# Patient Record
Sex: MALE | Race: BLACK OR AFRICAN AMERICAN | Employment: OTHER | URBAN - METROPOLITAN AREA
[De-identification: names, ages, dates, MRNs, and addresses within clinical notes are randomized per-mention and may not be internally consistent; named-entity substitution may affect disease eponyms.]

---

## 2017-06-30 ENCOUNTER — APPOINTMENT (OUTPATIENT)
Dept: CT IMAGING | Age: 32
DRG: 175 | End: 2017-06-30
Attending: PHYSICIAN ASSISTANT
Payer: OTHER GOVERNMENT

## 2017-06-30 ENCOUNTER — APPOINTMENT (OUTPATIENT)
Dept: GENERAL RADIOLOGY | Age: 32
DRG: 175 | End: 2017-06-30
Attending: PHYSICIAN ASSISTANT
Payer: OTHER GOVERNMENT

## 2017-06-30 ENCOUNTER — HOSPITAL ENCOUNTER (INPATIENT)
Age: 32
LOS: 5 days | Discharge: HOME | DRG: 175 | End: 2017-07-05
Attending: EMERGENCY MEDICINE | Admitting: HOSPITALIST
Payer: OTHER GOVERNMENT

## 2017-06-30 DIAGNOSIS — I26.99 BILATERAL PULMONARY EMBOLISM (HCC): Primary | ICD-10-CM

## 2017-06-30 DIAGNOSIS — I26.99 PE (PULMONARY THROMBOEMBOLISM) (HCC): ICD-10-CM

## 2017-06-30 LAB
ANION GAP BLD CALC-SCNC: 5 MMOL/L (ref 5–15)
APTT PPP: 26.3 SEC (ref 22.1–32.5)
BASOPHILS # BLD AUTO: 0 K/UL (ref 0–0.1)
BASOPHILS # BLD: 0 % (ref 0–1)
BNP SERPL-MCNC: <4 PG/ML (ref 0–100)
BUN SERPL-MCNC: 8 MG/DL (ref 6–20)
BUN/CREAT SERPL: 7 (ref 12–20)
CALCIUM SERPL-MCNC: 9.4 MG/DL (ref 8.5–10.1)
CHLORIDE SERPL-SCNC: 102 MMOL/L (ref 97–108)
CO2 SERPL-SCNC: 30 MMOL/L (ref 21–32)
CREAT SERPL-MCNC: 1.12 MG/DL (ref 0.7–1.3)
D DIMER PPP FEU-MCNC: 2.1 MG/L FEU (ref 0–0.65)
DIFFERENTIAL METHOD BLD: ABNORMAL
EOSINOPHIL # BLD: 0 K/UL (ref 0–0.4)
EOSINOPHIL NFR BLD: 0 % (ref 0–7)
ERYTHROCYTE [DISTWIDTH] IN BLOOD BY AUTOMATED COUNT: 14.6 % (ref 11.5–14.5)
GLUCOSE SERPL-MCNC: 87 MG/DL (ref 65–100)
HCT VFR BLD AUTO: 41.2 % (ref 36.6–50.3)
HGB BLD-MCNC: 13.2 G/DL (ref 12.1–17)
INR PPP: 1.1 (ref 0.9–1.1)
LYMPHOCYTES # BLD AUTO: 9 % (ref 12–49)
LYMPHOCYTES # BLD: 0.9 K/UL (ref 0.8–3.5)
MCH RBC QN AUTO: 23.2 PG (ref 26–34)
MCHC RBC AUTO-ENTMCNC: 32 G/DL (ref 30–36.5)
MCV RBC AUTO: 72.3 FL (ref 80–99)
MONOCYTES # BLD: 1.2 K/UL (ref 0–1)
MONOCYTES NFR BLD AUTO: 12 % (ref 5–13)
NEUTS SEG # BLD: 8 K/UL (ref 1.8–8)
NEUTS SEG NFR BLD AUTO: 79 % (ref 32–75)
PLATELET # BLD AUTO: 234 K/UL (ref 150–400)
POTASSIUM SERPL-SCNC: 4.1 MMOL/L (ref 3.5–5.1)
PROTHROMBIN TIME: 11.2 SEC (ref 9–11.1)
RBC # BLD AUTO: 5.7 M/UL (ref 4.1–5.7)
RBC MORPH BLD: ABNORMAL
RBC MORPH BLD: ABNORMAL
SODIUM SERPL-SCNC: 137 MMOL/L (ref 136–145)
THERAPEUTIC RANGE,PTTT: NORMAL SECS (ref 58–77)
TROPONIN I SERPL-MCNC: <0.04 NG/ML
WBC # BLD AUTO: 10.1 K/UL (ref 4.1–11.1)

## 2017-06-30 PROCEDURE — 85300 ANTITHROMBIN III ACTIVITY: CPT | Performed by: PHYSICIAN ASSISTANT

## 2017-06-30 PROCEDURE — 96374 THER/PROPH/DIAG INJ IV PUSH: CPT

## 2017-06-30 PROCEDURE — 85379 FIBRIN DEGRADATION QUANT: CPT | Performed by: PHYSICIAN ASSISTANT

## 2017-06-30 PROCEDURE — 84484 ASSAY OF TROPONIN QUANT: CPT | Performed by: HOSPITALIST

## 2017-06-30 PROCEDURE — 85306 CLOT INHIBIT PROT S FREE: CPT | Performed by: PHYSICIAN ASSISTANT

## 2017-06-30 PROCEDURE — 85730 THROMBOPLASTIN TIME PARTIAL: CPT | Performed by: PHYSICIAN ASSISTANT

## 2017-06-30 PROCEDURE — B246ZZZ ULTRASONOGRAPHY OF RIGHT AND LEFT HEART: ICD-10-PCS | Performed by: SPECIALIST

## 2017-06-30 PROCEDURE — 74011636320 HC RX REV CODE- 636/320: Performed by: EMERGENCY MEDICINE

## 2017-06-30 PROCEDURE — 85302 CLOT INHIBIT PROT C ANTIGEN: CPT | Performed by: PHYSICIAN ASSISTANT

## 2017-06-30 PROCEDURE — 74011000258 HC RX REV CODE- 258: Performed by: EMERGENCY MEDICINE

## 2017-06-30 PROCEDURE — 93970 EXTREMITY STUDY: CPT

## 2017-06-30 PROCEDURE — 83880 ASSAY OF NATRIURETIC PEPTIDE: CPT | Performed by: HOSPITALIST

## 2017-06-30 PROCEDURE — 74011250636 HC RX REV CODE- 250/636: Performed by: PHYSICIAN ASSISTANT

## 2017-06-30 PROCEDURE — 74011250637 HC RX REV CODE- 250/637: Performed by: NURSE PRACTITIONER

## 2017-06-30 PROCEDURE — 85610 PROTHROMBIN TIME: CPT | Performed by: PHYSICIAN ASSISTANT

## 2017-06-30 PROCEDURE — B54DZZZ ULTRASONOGRAPHY OF BILATERAL LOWER EXTREMITY VEINS: ICD-10-PCS | Performed by: SURGERY

## 2017-06-30 PROCEDURE — 85025 COMPLETE CBC W/AUTO DIFF WBC: CPT | Performed by: PHYSICIAN ASSISTANT

## 2017-06-30 PROCEDURE — 80048 BASIC METABOLIC PNL TOTAL CA: CPT | Performed by: PHYSICIAN ASSISTANT

## 2017-06-30 PROCEDURE — 81241 F5 GENE: CPT | Performed by: PHYSICIAN ASSISTANT

## 2017-06-30 PROCEDURE — 65660000000 HC RM CCU STEPDOWN

## 2017-06-30 PROCEDURE — 99285 EMERGENCY DEPT VISIT HI MDM: CPT

## 2017-06-30 PROCEDURE — 74011250636 HC RX REV CODE- 250/636: Performed by: HOSPITALIST

## 2017-06-30 PROCEDURE — 93005 ELECTROCARDIOGRAM TRACING: CPT

## 2017-06-30 PROCEDURE — 93306 TTE W/DOPPLER COMPLETE: CPT

## 2017-06-30 PROCEDURE — 71020 XR CHEST PA LAT: CPT

## 2017-06-30 PROCEDURE — 96361 HYDRATE IV INFUSION ADD-ON: CPT

## 2017-06-30 PROCEDURE — 74011250637 HC RX REV CODE- 250/637: Performed by: HOSPITALIST

## 2017-06-30 PROCEDURE — 36415 COLL VENOUS BLD VENIPUNCTURE: CPT | Performed by: PHYSICIAN ASSISTANT

## 2017-06-30 PROCEDURE — 74011000258 HC RX REV CODE- 258: Performed by: HOSPITALIST

## 2017-06-30 PROCEDURE — 85613 RUSSELL VIPER VENOM DILUTED: CPT | Performed by: PHYSICIAN ASSISTANT

## 2017-06-30 PROCEDURE — 71275 CT ANGIOGRAPHY CHEST: CPT

## 2017-06-30 PROCEDURE — 96375 TX/PRO/DX INJ NEW DRUG ADDON: CPT

## 2017-06-30 RX ORDER — SODIUM CHLORIDE 0.9 % (FLUSH) 0.9 %
5-10 SYRINGE (ML) INJECTION AS NEEDED
Status: DISCONTINUED | OUTPATIENT
Start: 2017-06-30 | End: 2017-07-05 | Stop reason: HOSPADM

## 2017-06-30 RX ORDER — SODIUM CHLORIDE 0.9 % (FLUSH) 0.9 %
5-10 SYRINGE (ML) INJECTION EVERY 8 HOURS
Status: DISCONTINUED | OUTPATIENT
Start: 2017-06-30 | End: 2017-07-05 | Stop reason: HOSPADM

## 2017-06-30 RX ORDER — MORPHINE SULFATE 4 MG/ML
4 INJECTION, SOLUTION INTRAMUSCULAR; INTRAVENOUS
Status: DISCONTINUED | OUTPATIENT
Start: 2017-06-30 | End: 2017-06-30

## 2017-06-30 RX ORDER — DIAZEPAM 5 MG/1
5 TABLET ORAL
Status: DISCONTINUED | OUTPATIENT
Start: 2017-06-30 | End: 2017-06-30

## 2017-06-30 RX ORDER — DOCUSATE SODIUM 100 MG/1
100 CAPSULE, LIQUID FILLED ORAL 2 TIMES DAILY
Status: DISCONTINUED | OUTPATIENT
Start: 2017-06-30 | End: 2017-07-05 | Stop reason: HOSPADM

## 2017-06-30 RX ORDER — ZOLPIDEM TARTRATE 5 MG/1
5 TABLET ORAL
Status: DISCONTINUED | OUTPATIENT
Start: 2017-06-30 | End: 2017-07-05 | Stop reason: HOSPADM

## 2017-06-30 RX ORDER — SODIUM CHLORIDE 0.9 % (FLUSH) 0.9 %
10 SYRINGE (ML) INJECTION
Status: COMPLETED | OUTPATIENT
Start: 2017-06-30 | End: 2017-06-30

## 2017-06-30 RX ORDER — HYDROCODONE BITARTRATE AND ACETAMINOPHEN 5; 325 MG/1; MG/1
2 TABLET ORAL
Status: DISCONTINUED | OUTPATIENT
Start: 2017-06-30 | End: 2017-06-30

## 2017-06-30 RX ORDER — DIAZEPAM 10 MG/2ML
5 INJECTION INTRAMUSCULAR
Status: COMPLETED | OUTPATIENT
Start: 2017-06-30 | End: 2017-06-30

## 2017-06-30 RX ORDER — MORPHINE SULFATE 4 MG/ML
4 INJECTION, SOLUTION INTRAMUSCULAR; INTRAVENOUS
Status: DISCONTINUED | OUTPATIENT
Start: 2017-07-01 | End: 2017-07-01 | Stop reason: SDUPTHER

## 2017-06-30 RX ORDER — ACETAMINOPHEN 325 MG/1
650 TABLET ORAL
Status: DISCONTINUED | OUTPATIENT
Start: 2017-06-30 | End: 2017-07-05 | Stop reason: HOSPADM

## 2017-06-30 RX ORDER — NALOXONE HYDROCHLORIDE 0.4 MG/ML
0.4 INJECTION, SOLUTION INTRAMUSCULAR; INTRAVENOUS; SUBCUTANEOUS AS NEEDED
Status: DISCONTINUED | OUTPATIENT
Start: 2017-06-30 | End: 2017-07-05 | Stop reason: HOSPADM

## 2017-06-30 RX ORDER — ENOXAPARIN SODIUM 100 MG/ML
1 INJECTION SUBCUTANEOUS
Status: COMPLETED | OUTPATIENT
Start: 2017-06-30 | End: 2017-06-30

## 2017-06-30 RX ORDER — KETOROLAC TROMETHAMINE 30 MG/ML
30 INJECTION, SOLUTION INTRAMUSCULAR; INTRAVENOUS
Status: COMPLETED | OUTPATIENT
Start: 2017-06-30 | End: 2017-06-30

## 2017-06-30 RX ORDER — HYDROCODONE BITARTRATE AND ACETAMINOPHEN 5; 325 MG/1; MG/1
1 TABLET ORAL
Status: DISCONTINUED | OUTPATIENT
Start: 2017-06-30 | End: 2017-07-01 | Stop reason: SDUPTHER

## 2017-06-30 RX ORDER — ONDANSETRON 2 MG/ML
4 INJECTION INTRAMUSCULAR; INTRAVENOUS
Status: DISCONTINUED | OUTPATIENT
Start: 2017-06-30 | End: 2017-07-05 | Stop reason: HOSPADM

## 2017-06-30 RX ORDER — DEXTROSE MONOHYDRATE AND SODIUM CHLORIDE 5; .9 G/100ML; G/100ML
100 INJECTION, SOLUTION INTRAVENOUS CONTINUOUS
Status: DISPENSED | OUTPATIENT
Start: 2017-06-30 | End: 2017-07-01

## 2017-06-30 RX ORDER — LIDOCAINE 50 MG/G
2 PATCH TOPICAL EVERY 24 HOURS
Status: DISCONTINUED | OUTPATIENT
Start: 2017-07-01 | End: 2017-07-05 | Stop reason: HOSPADM

## 2017-06-30 RX ADMIN — SODIUM CHLORIDE 1000 ML: 900 INJECTION, SOLUTION INTRAVENOUS at 12:52

## 2017-06-30 RX ADMIN — Medication 10 ML: at 13:38

## 2017-06-30 RX ADMIN — HYDROCODONE BITARTRATE AND ACETAMINOPHEN 1 TABLET: 5; 325 TABLET ORAL at 22:51

## 2017-06-30 RX ADMIN — KETOROLAC TROMETHAMINE 30 MG: 30 INJECTION, SOLUTION INTRAMUSCULAR at 15:16

## 2017-06-30 RX ADMIN — Medication 4 MG: at 21:15

## 2017-06-30 RX ADMIN — APIXABAN 10 MG: 5 TABLET, FILM COATED ORAL at 22:51

## 2017-06-30 RX ADMIN — DEXTROSE MONOHYDRATE AND SODIUM CHLORIDE 100 ML/HR: 5; .9 INJECTION, SOLUTION INTRAVENOUS at 15:30

## 2017-06-30 RX ADMIN — IOPAMIDOL 85 ML: 755 INJECTION, SOLUTION INTRAVENOUS at 13:38

## 2017-06-30 RX ADMIN — ENOXAPARIN SODIUM 90 MG: 100 INJECTION SUBCUTANEOUS at 15:16

## 2017-06-30 RX ADMIN — HYDROCODONE BITARTRATE AND ACETAMINOPHEN 1 TABLET: 5; 325 TABLET ORAL at 18:12

## 2017-06-30 RX ADMIN — Medication 10 ML: at 18:13

## 2017-06-30 RX ADMIN — KETOROLAC TROMETHAMINE 30 MG: 30 INJECTION, SOLUTION INTRAMUSCULAR at 12:18

## 2017-06-30 RX ADMIN — DIAZEPAM 5 MG: 5 INJECTION, SOLUTION INTRAMUSCULAR; INTRAVENOUS at 12:18

## 2017-06-30 RX ADMIN — SODIUM CHLORIDE 100 ML: 900 INJECTION, SOLUTION INTRAVENOUS at 13:38

## 2017-06-30 NOTE — LETTER
7/1/2017 10:17 AM 
 
Mr. Go [address] [facility] [facility] [address] Formerly [facility], later [facility] To whom it may concern: 
 
I am a hematologist at [facility] in [place], [place]. I am evaluating Mr. Barry Maldonado at [facility] for pulmonary embolism. Mr. Barry Maldonado has had a blood clot in his lungs which will require treatment with blood thinners. While on blood thinners he is at risk for life threatening bleeding with trauma.   
 
 
 
Sincerely, 
 
 
 
 
Tianna Ellis MD

## 2017-06-30 NOTE — PROGRESS NOTES
Admission Medication Reconciliation:    Information obtained from: patient     Significant PMH/Disease States:   History reviewed. No pertinent past medical history. Chief Complaint for this Admission:  PE     Allergies:  Review of patient's allergies indicates no known allergies. Prior to Admission Medications:   None         Comments/Recommendations: Patient denies regularly taking any prescription or non-prescription medications prior to admission.

## 2017-06-30 NOTE — ED NOTES
1:57 PM  Results noted, will order additional studies to include echocardiogram.  Start Lovenox.   Total critical care time spend exclusive of procedures:  45 minutes

## 2017-06-30 NOTE — IP AVS SNAPSHOT
Current Discharge Medication List  
  
START taking these medications Dose & Instructions Dispensing Information Comments Morning Noon Evening Bedtime  
 amoxicillin-clavulanate 875-125 mg per tablet Commonly known as:  AUGMENTIN Your last dose was: Your next dose is:    
   
   
 Dose:  1 Tab Take 1 Tab by mouth every twelve (12) hours for 6 days. Quantity:  12 Tab Refills:  0  
     
   
   
   
  
 * apixaban 5 mg tablet Commonly known as:  Janna Dutch John Your last dose was: Your next dose is:    
   
   
 Dose:  10 mg Take 2 Tabs by mouth two (2) times a day for 2 days. Quantity:  8 Tab Refills:  0  
     
   
   
   
  
 * apixaban 5 mg tablet Commonly known as:  Janna Dutch John Start taking on:  7/7/2017 Your last dose was: Your next dose is:    
   
   
 Dose:  5 mg Take 1 Tab by mouth two (2) times a day. Quantity:  60 Tab Refills:  1  
     
   
   
   
  
 oxyCODONE-acetaminophen  mg per tablet Commonly known as:  PERCOCET 10 Your last dose was: Your next dose is:    
   
   
 Dose:  1 Tab Take 1 Tab by mouth every four (4) hours as needed. Max Daily Amount: 6 Tabs. Quantity:  20 Tab Refills:  0  
     
   
   
   
  
 * Notice: This list has 2 medication(s) that are the same as other medications prescribed for you. Read the directions carefully, and ask your doctor or other care provider to review them with you. Where to Get Your Medications Information on where to get these meds will be given to you by the nurse or doctor. ! Ask your nurse or doctor about these medications  
  amoxicillin-clavulanate 875-125 mg per tablet  
 apixaban 5 mg tablet  
 apixaban 5 mg tablet  
 oxyCODONE-acetaminophen  mg per tablet

## 2017-06-30 NOTE — IP AVS SNAPSHOT
[address] [place] [address] 
[phone] Patient: Han Saenz Sr. MRN: [id number] XCU:[id number][birth year][id number] You are allergic to the following No active allergies Recent Documentation Height Weight BMI Smoking Status 1.854 m 90.7 kg 26.39 kg/m2 Never Smoker Unresulted Labs Order Current Status FACTOR V LEIDEN In process Emergency Contacts Name Discharge Info Relation Home Work Mobile Lorgladis Bars [1] Mother [14] [phone] About your hospitalization You were admitted on:  June 30, 2017 You last received care in the:  [facility] [id number] You were discharged on:  July 5, 2017 Unit phone number:  [phone] Why you were hospitalized Your primary diagnosis was:  Acute Pulmonary Embolism (Hcc) Your diagnoses also included:  Pneumonia Due To Gram-Negative Bacteria (Hcc) Providers Seen During Your Hospitalizations Provider Role Specialty Primary office phone Shy Driscoll MD Attending Provider Emergency Medicine [phone] Shala Shoemaker MD Attending Provider Internal Medicine [phone] Pat Mike MD Attending Provider Internal Medicine [phone] Shagufta Pablo MD Attending Provider Internal Medicine [phone] Your Primary Care Physician (PCP) Primary Care Physician Office Phone Office Fax [address] [phone] Follow-up Information Follow up With Details Comments Contact Info Shavon Lees MD Go on 7/20/2017 For new patient appointment on 7/20/17 at 11:45 AM. Please arrive 30 minutes early and bring your insurance card, ID, and list of medications. [address] [place] [address] 
[phone] Your Appointments Thursday July 20, 2017 11:45 AM EDT Any with Shavon Lees MD  
[facility] Internal Medicine ([facility]) Jayant Scott [address] [place] [address]  
[phone] Current Discharge Medication List  
  
START taking these medications Dose & Instructions Dispensing Information Comments Morning Noon Evening Bedtime  
 amoxicillin-clavulanate 875-125 mg per tablet Commonly known as:  AUGMENTIN Your last dose was: Your next dose is:    
   
   
 Dose:  1 Tab Take 1 Tab by mouth every twelve (12) hours for 6 days. Quantity:  12 Tab Refills:  0  
     
   
   
   
  
 * apixaban 5 mg tablet Commonly known as:  Tushar Garcia Your last dose was: Your next dose is:    
   
   
 Dose:  10 mg Take 2 Tabs by mouth two (2) times a day for 2 days. Quantity:  8 Tab Refills:  0  
     
   
   
   
  
 * apixaban 5 mg tablet Commonly known as:  Tushar Radha Start taking on:  7/7/2017 Your last dose was: Your next dose is:    
   
   
 Dose:  5 mg Take 1 Tab by mouth two (2) times a day. Quantity:  60 Tab Refills:  1  
     
   
   
   
  
 oxyCODONE-acetaminophen  mg per tablet Commonly known as:  PERCOCET 10 Your last dose was: Your next dose is:    
   
   
 Dose:  1 Tab Take 1 Tab by mouth every four (4) hours as needed. Max Daily Amount: 6 Tabs. Quantity:  20 Tab Refills:  0  
     
   
   
   
  
 * Notice: This list has 2 medication(s) that are the same as other medications prescribed for you. Read the directions carefully, and ask your doctor or other care provider to review them with you. Where to Get Your Medications Information on where to get these meds will be given to you by the nurse or doctor. ! Ask your nurse or doctor about these medications  
  amoxicillin-clavulanate 875-125 mg per tablet  
 apixaban 5 mg tablet  
 apixaban 5 mg tablet  
 oxyCODONE-acetaminophen  mg per tablet Discharge Instructions DISCHARGE SUMMARY from Nurse The following personal items are in your possession at time of discharge: 
 
Dental Appliances: None Visual Aid: None Home Medications: None Jewelry: Faustina Fill Clothing: With patient Other Valuables: Cell Phone, Other (comment) (rock) PATIENT INSTRUCTIONS: 
 
After general anesthesia or intravenous sedation, for 24 hours or while taking prescription Narcotics: · Limit your activities · Do not drive and operate hazardous machinery · Do not make important personal or business decisions · Do  not drink alcoholic beverages · If you have not urinated within 8 hours after discharge, please contact your surgeon on call. Report the following to your surgeon: 
· Excessive pain, swelling, redness or odor of or around the surgical area · Temperature over 100.5 · Nausea and vomiting lasting longer than 4 hours or if unable to take medications · Any signs of decreased circulation or nerve impairment to extremity: change in color, persistent  numbness, tingling, coldness or increase pain · Any questions What to do at Home: *  Please give a list of your current medications to your Primary Care Provider. *  Please update this list whenever your medications are discontinued, doses are 
    changed, or new medications (including over-the-counter products) are added. *  Please carry medication information at all times in case of emergency situations. These are general instructions for a healthy lifestyle: No smoking/ No tobacco products/ Avoid exposure to second hand smoke Surgeon General's Warning:  Quitting smoking now greatly reduces serious risk to your health. Obesity, smoking, and sedentary lifestyle greatly increases your risk for illness A healthy diet, regular physical exercise & weight monitoring are important for maintaining a healthy lifestyle You may be retaining fluid if you have a history of heart failure or if you experience any of the following symptoms:  Weight gain of 3 pounds or more overnight or 5 pounds in a week, increased swelling in our hands or feet or shortness of breath while lying flat in bed. Please call your doctor as soon as you notice any of these symptoms; do not wait until your next office visit. Recognize signs and symptoms of STROKE: 
 
F-face looks uneven A-arms unable to move or move unevenly S-speech slurred or non-existent T-time-call 911 as soon as signs and symptoms begin-DO NOT go Back to bed or wait to see if you get better-TIME IS BRAIN. Warning Signs of HEART ATTACK Call 911 if you have these symptoms: 
? Chest discomfort. Most heart attacks involve discomfort in the center of the chest that lasts more than a few minutes, or that goes away and comes back. It can feel like uncomfortable pressure, squeezing, fullness, or pain. ? Discomfort in other areas of the upper body. Symptoms can include pain or discomfort in one or both arms, the back, neck, jaw, or stomach. ? Shortness of breath with or without chest discomfort. ? Other signs may include breaking out in a cold sweat, nausea, or lightheadedness. Don't wait more than five minutes to call [address]! Fast action can save your life. Calling 911 is almost always the fastest way to get lifesaving treatment. Emergency Medical Services staff can begin treatment when they arrive  up to an hour sooner than if someone gets to the hospital by car. The discharge information has been reviewed with the patient. The patient verbalized understanding. Discharge medications reviewed with the patient and appropriate educational materials and side effects teaching were provided. [employer] Activation Thank you for requesting access to [employer]. Please follow the instructions below to securely access and download your online medical record.  [employer] allows you to send messages to your doctor, view your test results, renew your prescriptions, schedule appointments, and more. How Do I Sign Up? 1. In your internet browser, go to [email or web address] 
2. Click on the First Time User? Click Here link in the Sign In box. You will be redirect to the New Member Sign Up page. 3. Enter your "[employer] Access Code exactly as it appears below. You will not need to use this code after youve completed the sign-up process. If you do not sign up before the expiration date, you must request a new code. "[employer] Access Code: [email or web address] Expires: 2017  4:54 PM (This is the date your "[employer] access code will ) 4. Enter the last four digits of your Social Security Number (xxxx) and Date of Birth (mm/dd/yyyy) as indicated and click Submit. You will be taken to the next sign-up page. 5. Create a "[employer] ID. This will be your "[employer] login ID and cannot be changed, so think of one that is secure and easy to remember. 6. Create a "[employer] password. You can change your password at any time. 7. Enter your Password Reset Question and Answer. This can be used at a later time if you forget your password. 8. Enter your e-mail address. You will receive e-mail notification when new information is available in [address] 9. Click Sign Up. You can now view and download portions of your medical record. 10. Click the Download Summary menu link to download a portable copy of your medical information. Additional Information If you have questions, please visit the Frequently Asked Questions section of the "[employer] website at [email or web address]. [employer]. [email or web address]/[employer]/. Remember, "[employer] is NOT to be used for urgent needs. For medical emergencies, dial 911. Discharge Instructions PATIENT ID: Hermann Paris Sr. MRN: [id number] YOB: 1985 DATE OF ADMISSION: 2017 10:57 AM   
DATE OF DISCHARGE: 2017 PRIMARY CARE PROVIDER: Santiago Posey MD  
 
ATTENDING PHYSICIAN: Cristian Braga MD 
DISCHARGING PROVIDER: Yeison King NP To contact this individual call [phone] and ask the  to page. If unavailable ask to be transferred the Adult Hospitalist Department. DISCHARGE DIAGNOSES Bilateral Pulmonary Emboli, Pneumonia CONSULTATIONS: IP CONSULT TO HEMATOLOGY PROCEDURES/SURGERIES: * No surgery found * PENDING TEST RESULTS:  
At the time of discharge the following test results are still pending: none FOLLOW UP APPOINTMENTS:  
Follow-up Information Follow up With Details Comments Contact Info Santiago Posey MD Go on 7/20/2017 For new patient appointment on 7/20/17 at 11:45 AM. Please arrive 30 minutes early and bring your insurance card, ID, and list of medications. [address] [place] [address], [facility] 
[phone] ADDITIONAL CARE RECOMMENDATIONS:  
1. We have prescribed you the following new medications: 
-Eliquis, this is the blood thinner for the blood clots in your lungs. You will need to take 10mg (2 tabs) twice daily for 2 days then you can decrease to 5mg (1 tablet) twice daily until further directed. -Augmentin this is antibiotic for pneumonia. Make sure to take with food as it can cause stomach upset. -Percocet you can take this as needed for pain. Please not this is a narcotic that can make you drowsy. Do not drive after taking the medication. See below for more information on the medications to include common side effects. 2. Make sure to use the incentive spirometer 10 times a hour while awake. 3. Recommending you following up hematology at [facility] for further clotting disorder work up. DIET: Regular ACTIVITY: as tolerated WOUND CARE: none EQUIPMENT needed: none DISCHARGE MEDICATIONS: 
Apixaban (By mouth) Apixaban (a-PIX-a-ban) Treats and prevents blood clots. This medicine is a blood thinner. Brand Name(s): Eliquis There may be other brand names for this medicine. When This Medicine Should Not Be Used: This medicine is not right for everyone. Do not use it if you had an allergic reaction to apixaban or you have active bleeding. How to Use This Medicine:  
Tablet · Your doctor will tell you how much medicine to use. Do not use more than directed. · If you are not able to swallow the tablets whole, they may be crushed and mixed in water, 5% dextrose in water (D5W), apple juice, or applesauce. The crushed tablets may be mixed with 60 mL of water or D5W dose and given through a nasogastric tube (NGT). · This medicine should come with a Medication Guide. Ask your pharmacist for a copy if you do not have one. · Missed dose: Take a dose as soon as you remember. If it is almost time for your next dose, wait until then and take a regular dose. Do not take extra medicine to make up for a missed dose. · Store the medicine in a closed container at room temperature, away from heat, moisture, and direct light. Drugs and Foods to Avoid: Ask your doctor or pharmacist before using any other medicine, including over-the-counter medicines, vitamins, and herbal products. · Some medicines can affect how apixaban works. Tell your doctor if you are using any of the following: ¨ Carbamazepine, clarithromycin, itraconazole, ketoconazole, phenytoin, rifampin, ritonavir, Long's wort ¨ Blood thinner (including clopidogrel, heparin, prasugrel, warfarin) ¨ Medicine to treat depression ¨ NSAID pain or arthritis medicine (including aspirin, celecoxib, diclofenac, ibuprofen, naproxen) Warnings While Using This Medicine: · Tell your doctor if you are pregnant or breastfeeding, or if you have kidney disease, liver disease, bleeding problems, or an artificial heart valve. · Do not stop using this medicine suddenly without asking your doctor.  You might have a higher risk of stroke for a short time after you stop using this medicine. · This medicine increases your risk for bleeding that can become serious if not controlled. You may also bruise easily, and it may take longer than usual for bleeding to stop. · This medicine may increase your risk for blood clots in your spine or back if you undergo an epidural or spinal puncture. This could lead to paralysis. Tell your doctor if you ever had spine problems or back surgery. · Tell any doctor or dentist who treats you that you are using this medicine. With your doctor's supervision, you may need to stop using this medicine several days before you have surgery or medical tests. · Your doctor will do lab tests at regular visits to check on the effects of this medicine. Keep all appointments. · Keep all medicine out of the reach of children. Never share your medicine with anyone. Possible Side Effects While Using This Medicine:  
Call your doctor right away if you notice any of these side effects: · Allergic reaction: Itching or hives, swelling in your face or hands, swelling or tingling in your mouth or throat, chest tightness, trouble breathing · Change in how much or how often you urinate, red or pink urine · Chest pain, trouble breathing · Coughing up blood, vomiting blood or material that looks like coffee grounds · Numbness, tingling, or muscle weakness in your legs or feet · Red or black, tarry stools · Unusual bleeding, bruising, or weakness If you notice other side effects that you think are caused by this medicine, tell your doctor. Call your doctor for medical advice about side effects. You may report side effects to FDA at 6-881-FDA-0209 © 2017 [address] Information is for End User's use only and may not be sold, redistributed or otherwise used for commercial purposes. The above information is an  only.  It is not intended as medical advice for individual conditions or treatments. Talk to your doctor, nurse or pharmacist before following any medical regimen to see if it is safe and effective for you. Amoxicillin/Clavulanate Potassium (By mouth) Amoxicillin (t-nbh-m-SCOTT-in), Clavulanate Potassium (UJZW-ip-cy-fiona qcg-OKN-gc-um) Treats infections. This medicine is a penicillin antibiotic. Brand Name(s): Augmentin, Augmentin ES-600, Augmentin XR There may be other brand names for this medicine. When This Medicine Should Not Be Used: This medicine is not right for everyone. Do not use it if you had an allergic reaction to amoxicillin, clavulanate, or a similar antibiotic (penicillin or cephalosporin), or if you had liver problems caused by Augmentin®. How to Use This Medicine:  
Liquid, Tablet, Chewable Tablet, Long Acting Tablet · Your doctor will tell you how much medicine to use. Do not use more than directed. · Take this medicine with a snack or at the beginning of a meal to help prevent nausea. · Chewable tablets: Chew the tablet completely before you swallow it. · Measure the oral liquid medicine with a marked measuring spoon, oral syringe, or medicine cup. Shake the medicine well just before you measure each dose. Rinse the spoon or dropper after each use. · Swallow the extended-release tablet whole. Do not crush, break, or chew it. · Take all of the medicine in your prescription to clear up your infection, even if you feel better after the first few doses. · Missed dose: Take a dose as soon as you remember. If it is almost time for your next dose, wait until then and take a regular dose. Do not take extra medicine to make up for a missed dose. · Tablet, extended-release tablet, chewable tablet: Store at room temperature, away from heat, moisture, and direct light. · Oral liquid: Store in the refrigerator. Do not freeze. · Throw away any unused oral liquid after 10 days. Drugs and Foods to Avoid: Ask your doctor or pharmacist before using any other medicine, including over-the-counter medicines, vitamins, and herbal products. · Some medicines can affect how this medicine works. Tell your doctor if you are taking a blood thinner (such as warfarin), allopurinol, or probenecid. Warnings While Using This Medicine: · Tell your doctor if you are pregnant or breastfeeding, or if you have kidney disease, liver disease, or mononucleosis (mono). · Birth control pills may not work as well while you are taking this medicine. Use another form of birth control to prevent pregnancy. · This medicine can cause diarrhea. Call your doctor if the diarrhea becomes severe, does not stop, or is bloody. Do not take any medicine to stop diarrhea until you have talked to your doctor. Diarrhea can occur 2 months or more after you stop taking this medicine. · Tell any doctor or dentist who treats you that you are using this medicine. This medicine may affect certain medical test results. · Call your doctor if your symptoms do not improve or if they get worse. · The chewable tablet and oral liquid contain phenylalanine. Talk to your doctor before you use this medicine if you have phenylketonuria (PKU). · Keep all medicine out of the reach of children. Never share your medicine with anyone. Possible Side Effects While Using This Medicine:  
Call your doctor right away if you notice any of these side effects: · Allergic reaction: Itching or hives, swelling in your face or hands, swelling or tingling in your mouth or throat, chest tightness, trouble breathing · Blistering, peeling, red skin rash · Change in how much or how often you urinate · Dark urine or pale stools, nausea, vomiting, loss of appetite, stomach pain, yellow eyes or skin · Diarrhea that may contain blood, stomach cramps If you notice these less serious side effects, talk with your doctor: · Diaper rash · Mild diarrhea, nausea, vomiting · Tooth discoloration (in children) If you notice other side effects that you think are caused by this medicine, tell your doctor. Call your doctor for medical advice about side effects. You may report side effects to FDA at 1-926-FDA-2941 © 2017 [employer] Information is for End User's use only and may not be sold, redistributed or otherwise used for commercial purposes. The above information is an  only. It is not intended as medical advice for individual conditions or treatments. Talk to your doctor, nurse or pharmacist before following any medical regimen to see if it is safe and effective for you. Oxycodone/Acetaminophen (By mouth) Acetaminophen (t-nvjq-t-MIN-oh-fen), Oxycodone Hydrochloride (ts-v-CCP-done shabnam-droe-KLOR-jayna) Treats moderate to moderately severe pain. This medicine is a narcotic pain reliever. Brand Name(s): Endocet, Percocet, Primlev, Roxicet, Xartemis XR There may be other brand names for this medicine. When This Medicine Should Not Be Used: This medicine is not right for everyone. Do not use it if you had an allergic reaction to acetaminophen or oxycodone, or if you have serious breathing problems or paralytic ileus. How to Use This Medicine:  
Capsule, Liquid, Tablet, Long Acting Tablet · Your doctor will tell you how much medicine to use. Do not use more than directed. · An overdose can be dangerous. Follow directions carefully so you do not get too much medicine at one time. · Oral liquid: Measure the oral liquid medicine with a marked measuring spoon, oral syringe, or medicine cup. · Swallow the extended-release tablet whole. Do not crush, break, or chew it. Do not lick or wet the tablet before placing it in your mouth. Do not give this medicine through a feeding tube. · This medicine should come with a Medication Guide. Ask your pharmacist for a copy if you do not have one. · Missed dose: If you miss a dose of this medicine, skip the missed dose and go back to your regular dosing schedule. Do not double doses. · Store the medicine in a closed container at room temperature, away from heat, moisture, and direct light. Ask your pharmacist about the best way to dispose of medicine you do not use. Drugs and Foods to Avoid: Ask your doctor or pharmacist before using any other medicine, including over-the-counter medicines, vitamins, and herbal products. · Do not use Xartemis XR if you are using or have used an MAO inhibitor in the past 14 days. · Some medicines can affect how this medicine works. Tell your doctor if you are using any of the following: ¨ Carbamazepine, erythromycin, ketoconazole, lamotrigine, mirtazapine, naltrexone, phenytoin, propranolol, rifampin, ritonavir, tramadol, trazodone, or zidovudine ¨ Birth control pills ¨ Diuretic (water pill) ¨ Medicine to treat depression ¨ Phenothiazine medicine ¨ Triptan medicine to treat migraine headaches · Do not drink alcohol while you are using this medicine. Acetaminophen can damage your liver, and alcohol can increase this risk. Do not take acetaminophen without asking your doctor if you have 3 or more drinks of alcohol every day. · Tell your doctor if you use anything else that makes you sleepy. Some examples are allergy medicine, narcotic pain medicine, and alcohol. Tell your doctor if you are using buprenorphine, butorphanol, nalbuphine, pentazocine, a benzodiazepine, or a muscle relaxer. Warnings While Using This Medicine: · Tell your doctor if you are pregnant or breastfeeding, or if you have kidney disease, liver disease, heart disease, low blood pressure, breathing problems or lung disease (such as asthma, COPD), thyroid problems, Emaunel disease, pancreas or gallbladder problems, prostate problems, trouble urinating, or a stomach problems, or a history of head injury or brain damage, seizures, or alcohol or drug abuse. Tell your doctor if you are allergic to codeine. · This medicine may cause the following problems: 
¨ High risk of overdose, which can lead to death ¨ Respiratory depression (serious breathing problem that can be life-threatening) ¨ Liver problems ¨ Serious skin reactions ¨ Serotonin syndrome (when used with certain medicines) · This medicine may make you dizzy or drowsy. Do not drive or do anything that could be dangerous until you know how this medicine affects you. Sit or lie down if you feel dizzy. Stand up carefully. · This medicine contains acetaminophen. Read the labels of all other medicines you are using to see if they also contain acetaminophen, or ask your doctor or pharmacist. Oumou Parra not use more than 4 grams (4,000 milligrams) total of acetaminophen in one day. · This medicine can be habit-forming. Do not use more than your prescribed dose. Call your doctor if you think your medicine is not working. · Do not stop using this medicine suddenly. Your doctor will need to slowly decrease your dose before you stop it completely. · This medicine could cause infertility. Talk with your doctor before using this medicine if you plan to have children. · This medicine may cause constipation, especially with long-term use. Ask your doctor if you should use a laxative to prevent and treat constipation. · Keep all medicine out of the reach of children. Never share your medicine with anyone. Possible Side Effects While Using This Medicine:  
Call your doctor right away if you notice any of these side effects: · Allergic reaction: Itching or hives, swelling in your face or hands, swelling or tingling in your mouth or throat, chest tightness, trouble breathing · Anxiety, restlessness, fast heartbeat, fever, muscle spasms, twitching, diarrhea, seeing or hearing things that are not there · Blistering, peeling, red skin rash · Blue lips, fingernails, or skin · Dark urine or pale stools, loss of appetite, stomach pain, yellow skin or eyes · Extreme weakness, shallow breathing, uneven heartbeat, seizures, sweating, or cold or clammy skin · Severe confusion, lightheadedness, dizziness, or fainting · Severe constipation, nausea, or vomiting · Trouble breathing or slow breathing If you notice these less serious side effects, talk with your doctor:  
· Headache · Mild constipation, nausea, or vomiting · Mild sleepiness or drowsiness If you notice other side effects that you think are caused by this medicine, tell your doctor. Call your doctor for medical advice about side effects. You may report side effects to FDA at 8-088-XXR-8508 © 2017 [address] Information is for End User's use only and may not be sold, redistributed or otherwise used for commercial purposes. The above information is an  only. It is not intended as medical advice for individual conditions or treatments. Talk to your doctor, nurse or pharmacist before following any medical regimen to see if it is safe and effective for you. See Medication Reconciliation Form · It is important that you take the medication exactly as they are prescribed. · Keep your medication in the bottles provided by the pharmacist and keep a list of the medication names, dosages, and times to be taken in your wallet. · Do not take other medications without consulting your doctor. NOTIFY YOUR PHYSICIAN FOR ANY OF THE FOLLOWING:  
Fever over 101 degrees for 24 hours. Chest pain, shortness of breath, fever, chills, nausea, vomiting, diarrhea, change in mentation, falling, weakness, bleeding. Severe pain or pain not relieved by medications. Or, any other signs or symptoms that you may have questions about. DISPOSITION: 
 X Home With: 
 OT  PT  [facility]  RN  
  
 SNF/Inpatient Rehab/LTAC Independent/assisted living Hospice Other: CDMP Checked: Yes X PROBLEM LIST Updated: Yes X Signed:  
Bri Tineo NP 
7/5/2017 Discharge Instructions Attachments/References PULMONARY EMBOLISM (ENGLISH) Discharge Orders None [email or web address] Announcement We are excited to announce that we are making your provider's discharge notes available to you in [email or web address]. You will see these notes when they are completed and signed by the physician that discharged you from your recent hospital stay. If you have any questions or concerns about any information you see in [email or web address], please call the Health Information Department where you were seen or reach out to your Primary Care Provider for more information about your plan of care. Introducing [facility]! Simi Zacarias introduces [email or web address] patient portal. Now you can access parts of your medical record, email your doctor's office, and request medication refills online. 1. In your internet browser, go to [email or web address] 2. Click on the First Time User? Click Here link in the Sign In box. You will see the New Member Sign Up page. 3. Enter your [email or web address] Access Code exactly as it appears below. You will not need to use this code after youve completed the sign-up process. If you do not sign up before the expiration date, you must request a new code. · [email or web address] Access Code: [email or web address] Expires: 9/29/2017  4:54 PM 
 
4. Enter the last four digits of your Social Security Number (xxxx) and Date of Birth (mm/dd/yyyy) as indicated and click Submit. You will be taken to the next sign-up page. 5. Create a [email or web address] ID. This will be your [email or web address] login ID and cannot be changed, so think of one that is secure and easy to remember. 6. Create a [email or web address] password. You can change your password at any time. 7. Enter your Password Reset Question and Answer. This can be used at a later time if you forget your password. 8. Enter your e-mail address. You will receive e-mail notification when new information is available in [address] 9. Click Sign Up. You can now view and download portions of your medical record. 10. Click the Download Summary menu link to download a portable copy of your medical information. If you have questions, please visit the Frequently Asked Questions section of the [employer] website. Remember, [employer] is NOT to be used for urgent needs. For medical emergencies, dial 911. Now available from your iPhone and Android! General Information Please provide this summary of care documentation to your next provider. Patient Signature:  ____________________________________________________________ Date:  ____________________________________________________________  
  
[facility] Provider Signature:  ____________________________________________________________ Date:  ____________________________________________________________ More Information Pulmonary Embolism: Care Instructions Your Care Instructions Pulmonary embolism is the sudden blockage of an artery in the lung. Blood clots in the deep veins of the leg or pelvis (deep vein thrombosis, or DVT) are the most common cause. These blood clots can travel to the lungs. Pulmonary embolism can be very serious. Because you have had one pulmonary embolism, you are at greater risk for having another one. But you can take steps to prevent another pulmonary embolism by following your doctor's instructions. You will probably take a prescription blood-thinning medicine to prevent blood clots. A blood thinner can stop a blood clot from growing larger and prevent new clots from forming. Follow-up care is a key part of your treatment and safety. Be sure to make and go to all appointments, and call your doctor if you are having problems.  It's also a good idea to know your test results and keep a list of the medicines you take. How can you care for yourself at home? · Take your medicines exactly as prescribed. Call your doctor if you think you are having a problem with your medicine. You will get more details on the specific medicines your doctor prescribes. · If you are taking a blood thinner, be sure you get instructions about how to take your medicine safely. Blood thinners can cause serious bleeding problems. Preventing future pulmonary embolisms · Exercise. Keep blood moving in your legs to keep clots from forming. If you are traveling by car, stop every hour or so. Get out and walk around for a few minutes. If you are traveling by bus, train, or plane, get out of your seat and walk up and down the aisles every hour or so. You also can do leg exercises while you are seated. Pump your feet up and down by pulling your toes up toward your knees then pointing them down. · Get up out of bed as soon as possible after an illness or surgery. · Do not smoke. If you need help quitting, talk to your doctor about stop-smoking programs and medicines. These can increase your chances of quitting for good. · Check with your doctor before taking hormone or birth control pills. These may increase your risk of blot clots. · Ask your doctor about wearing compression stockings to help prevent blood clots in your legs. You can buy these with a prescription at medical supply stores and some drugstores. When should you call for help? Call 911 anytime you think you may need emergency care. For example, call if: 
· You have shortness of breath. · You have chest pain. · You passed out (lost consciousness). · You cough up blood. Call your doctor now or seek immediate medical care if: 
· You have new or worsening pain or swelling in your leg. Watch closely for changes in your health, and be sure to contact your doctor if: 
· You do not get better as expected. Where can you learn more? Go to http://negar-jovana.info/. Enter Y110 in the search box to learn more about \"Pulmonary Embolism: Care Instructions. \" Current as of: June 4, 2016 Content Version: 11.3 © 3281-7746 [employer]ite. Care instructions adapted under license by [employer] (which disclaims liability or warranty for this information). If you have questions about a medical condition or this instruction, always ask your healthcare professional. [place] [address] any warranty or liability for your use of this information.

## 2017-06-30 NOTE — PROCEDURES
[address]  *** FINAL REPORT ***    Name: Joana Rios  MRN: [id number]  : 19 May 1985  HIS Order #: [id number]  [address] [facility] Visit #: [id number]  Date: 2017    TYPE OF TEST: Peripheral Venous Testing    REASON FOR TEST  Chest pain acute, pulmonary origion    Right Leg:-  Deep venous thrombosis:           No  Superficial venous thrombosis:    No  Deep venous insufficiency:        Not examined  Superficial venous insufficiency: Not examined    Left Leg:-  Deep venous thrombosis:           No  Superficial venous thrombosis:    No  Deep venous insufficiency:        Not examined  Superficial venous insufficiency: Not examined      INTERPRETATION/FINDINGS  PROCEDURE:  Color duplex ultrasound imaging of lower extremity veins. FINDINGS:       Right: The common femoral, deep femoral, femoral, popliteal,  posterior tibial, peroneal, and great saphenous are patent and without   evidence of thrombus;  each is fully compressible and there is no  narrowing of the flow channel on color Doppler imaging. Phasic flow  is observed in the common femoral vein. Left:   The common femoral, deep femoral, femoral, popliteal,  posterior tibial, peroneal, and great saphenous are patent and without   evidence of thrombus;  each is fully compressible and there is no  narrowing of the flow channel on color Doppler imaging. Phasic flow  is observed in the common femoral vein. IMPRESSION:  No evidence of right or left lower extremity vein  thrombosis. ADDITIONAL COMMENTS    I have personally reviewed the data relevant to the interpretation of  this  study.     TECHNOLOGIST: Patrice Smith RVT  Signed: 2017 03:54 PM    PHYSICIAN: Genoveva Nicole MD  Signed: 2017 04:34 PM

## 2017-06-30 NOTE — ROUTINE PROCESS
TRANSFER - OUT REPORT:    Verbal report given to Aliza ROBERTSON(name) on Cesar Somers Sr.  being transferred to [facility](unit) for routine progression of care       Report consisted of patients Situation, Background, Assessment and   Recommendations(SBAR). Information from the following report(s) SBAR and ED Summary was reviewed with the receiving nurse. Lines:   Peripheral IV 06/30/17 Left Antecubital (Active)   Site Assessment Clean, dry, & intact 6/30/2017 12:16 PM   Phlebitis Assessment 0 6/30/2017 12:16 PM   Infiltration Assessment 0 6/30/2017 12:16 PM   Dressing Status Clean, dry, & intact 6/30/2017 12:16 PM        Opportunity for questions and clarification was provided.       Patient transported with:   George Pickering

## 2017-06-30 NOTE — ED NOTES
I personally saw and examined the patient. I have reviewed and agree with the MLP's findings, including all diagnostic interpretations, and plans as written. I was present during the key portions of separately billed procedures.     Trupti Monsalve MD

## 2017-06-30 NOTE — ED NOTES
Verbal shift change report given to aCrrington Smith RN (oncoming nurse) by Griselda Conner, RN (offgoing nurse). Report included the following information SBAR, ED Summary, MAR and Recent Results.

## 2017-06-30 NOTE — ED TRIAGE NOTES
Pt c/o R shoulder pain that radiates down to his R hip x 2 days. He states \"it hurts to take a deep breath. \"  Pt denies injury.

## 2017-06-30 NOTE — ED PROVIDER NOTES
HPI Comments: 28 y/o AA otherwise healthy male presents to the ED for the evaluation of right sided pain from shoulder down to hip. According to patient, these symptoms started about 2 days ago. He denies any injury. The pain is sharp in quality. Moderate to severe in severity. He states it \"hurts to take a deep breath\". He denies any chest pain. He has noticed some shortness of breath due to the pain. No abdominal pain noted. No lower leg pain/swelling noted. No hx of DVT/PE. Patient does mention having hx of back problems. No other acute medical complaints expressed at this time. The history is provided by the patient. No  was used. History reviewed. No pertinent past medical history. History reviewed. No pertinent surgical history. Family History:   Problem Relation Age of Onset    Hypertension Mother        Social History     Social History    Marital status: SINGLE     Spouse name: N/A    Number of children: N/A    Years of education: N/A     Occupational History    Not on file. Social History Main Topics    Smoking status: Never Smoker    Smokeless tobacco: Never Used    Alcohol use Yes      Comment: \"social\"    Drug use: No    Sexual activity: Yes     Partners: Female     Birth control/ protection: None     Other Topics Concern    Not on file     Social History Narrative    ** Merged History Encounter **              ALLERGIES: Review of patient's allergies indicates no known allergies. Review of Systems   Constitutional: Negative for chills and fever. HENT: Negative. Eyes: Negative. Respiratory: Negative for chest tightness and shortness of breath. Cardiovascular: Negative for chest pain. Gastrointestinal: Negative for abdominal pain, constipation, diarrhea, nausea and vomiting. Genitourinary: Negative for flank pain and hematuria. Musculoskeletal: Positive for back pain. Negative for neck pain and neck stiffness.    Skin: Negative. Negative for rash and wound. Neurological: Negative for dizziness, weakness, light-headedness, numbness and headaches. Psychiatric/Behavioral: Negative. All other systems reviewed and are negative. Vitals:    06/30/17 1107 06/30/17 1300 06/30/17 1350 06/30/17 1357   BP: 131/83 134/70 146/79 134/87   Pulse: 88 69 84    Resp: 18 16 16    Temp: 98.4 °F (36.9 °C)      SpO2: 99% 99% 100%    Weight: 90.7 kg (200 lb)      Height: 6' 1\" (1.854 m)               Physical Exam   Constitutional: He is oriented to person, place, and time. He appears well-developed and well-nourished. No distress. HENT:   Head: Normocephalic and atraumatic. Eyes: EOM are normal. Pupils are equal, round, and reactive to light. Neck: Normal range of motion. Neck supple. Cardiovascular: Normal rate, regular rhythm, normal heart sounds and intact distal pulses. Exam reveals no gallop and no friction rub. No murmur heard. Pulmonary/Chest: Effort normal and breath sounds normal. No respiratory distress. He has no wheezes. Abdominal: Soft. Bowel sounds are normal. He exhibits no distension and no mass. There is no tenderness. There is no rebound and no guarding. Musculoskeletal: Normal range of motion. Right shoulder: He exhibits tenderness and pain. He exhibits no bony tenderness. Arms:  Neurological: He is alert and oriented to person, place, and time. He has normal reflexes. Coordination normal.   Skin: Skin is warm and dry. No rash noted. No erythema. Psychiatric: He has a normal mood and affect. His behavior is normal.   Nursing note and vitals reviewed.        [facility]  ED Course       Procedures    28 y/o male with bilateral pe with pulmonary infarct  Will start lovenox, order hypercoaguable panel, 2d echo and lovenox  Spoke with hospitalist regarding patient hx, pe findings and will admit patient  Discussed with Dr. Chinyere Morgan     EKG interpretation: (Preliminary)  Rhythm: normal sinus rhythm; and regular .  Rate (approx.): 82; Axis: normal; patient with [id number] pattern on EKG  Discussed with and evaluated by Dr. Efraín Moore

## 2017-07-01 ENCOUNTER — APPOINTMENT (OUTPATIENT)
Dept: GENERAL RADIOLOGY | Age: 32
DRG: 175 | End: 2017-07-01
Attending: FAMILY MEDICINE
Payer: OTHER GOVERNMENT

## 2017-07-01 LAB
ANION GAP BLD CALC-SCNC: 6 MMOL/L (ref 5–15)
AT III PPP CHRO-ACNC: 105 % (ref 75–135)
ATRIAL RATE: 82 BPM
BUN SERPL-MCNC: 5 MG/DL (ref 6–20)
BUN/CREAT SERPL: 5 (ref 12–20)
CALCIUM SERPL-MCNC: 8.6 MG/DL (ref 8.5–10.1)
CALCULATED P AXIS, ECG09: 50 DEGREES
CALCULATED R AXIS, ECG10: 52 DEGREES
CALCULATED T AXIS, ECG11: 1 DEGREES
CHLORIDE SERPL-SCNC: 104 MMOL/L (ref 97–108)
CO2 SERPL-SCNC: 27 MMOL/L (ref 21–32)
CREAT SERPL-MCNC: 1 MG/DL (ref 0.7–1.3)
DIAGNOSIS, 93000: NORMAL
GLUCOSE SERPL-MCNC: 99 MG/DL (ref 65–100)
P-R INTERVAL, ECG05: 132 MS
POTASSIUM SERPL-SCNC: 3.7 MMOL/L (ref 3.5–5.1)
PROT S ACT/NOR PPP: 112 % (ref 63–140)
Q-T INTERVAL, ECG07: 346 MS
QRS DURATION, ECG06: 80 MS
QTC CALCULATION (BEZET), ECG08: 404 MS
SODIUM SERPL-SCNC: 137 MMOL/L (ref 136–145)
VENTRICULAR RATE, ECG03: 82 BPM

## 2017-07-01 PROCEDURE — 80048 BASIC METABOLIC PNL TOTAL CA: CPT | Performed by: HOSPITALIST

## 2017-07-01 PROCEDURE — 71010 XR CHEST PORT: CPT

## 2017-07-01 PROCEDURE — 74011250636 HC RX REV CODE- 250/636: Performed by: NURSE PRACTITIONER

## 2017-07-01 PROCEDURE — 74011000258 HC RX REV CODE- 258: Performed by: HOSPITALIST

## 2017-07-01 PROCEDURE — 74011250636 HC RX REV CODE- 250/636: Performed by: INTERNAL MEDICINE

## 2017-07-01 PROCEDURE — 74011250637 HC RX REV CODE- 250/637: Performed by: INTERNAL MEDICINE

## 2017-07-01 PROCEDURE — 65660000000 HC RM CCU STEPDOWN

## 2017-07-01 PROCEDURE — 74011250637 HC RX REV CODE- 250/637: Performed by: HOSPITALIST

## 2017-07-01 PROCEDURE — 36415 COLL VENOUS BLD VENIPUNCTURE: CPT | Performed by: HOSPITALIST

## 2017-07-01 RX ORDER — OXYCODONE AND ACETAMINOPHEN 5; 325 MG/1; MG/1
1-2 TABLET ORAL
Status: DISCONTINUED | OUTPATIENT
Start: 2017-07-01 | End: 2017-07-04 | Stop reason: SDUPTHER

## 2017-07-01 RX ORDER — HYDROMORPHONE HYDROCHLORIDE 1 MG/ML
2 INJECTION, SOLUTION INTRAMUSCULAR; INTRAVENOUS; SUBCUTANEOUS
Status: DISCONTINUED | OUTPATIENT
Start: 2017-07-01 | End: 2017-07-04

## 2017-07-01 RX ADMIN — OXYCODONE HYDROCHLORIDE AND ACETAMINOPHEN 1 TABLET: 5; 325 TABLET ORAL at 16:49

## 2017-07-01 RX ADMIN — DOCUSATE SODIUM 100 MG: 100 CAPSULE, LIQUID FILLED ORAL at 17:30

## 2017-07-01 RX ADMIN — HYDROCODONE BITARTRATE AND ACETAMINOPHEN 1 TABLET: 5; 325 TABLET ORAL at 13:52

## 2017-07-01 RX ADMIN — DEXTROSE MONOHYDRATE AND SODIUM CHLORIDE 100 ML/HR: 5; .9 INJECTION, SOLUTION INTRAVENOUS at 02:00

## 2017-07-01 RX ADMIN — HYDROCODONE BITARTRATE AND ACETAMINOPHEN 1 TABLET: 5; 325 TABLET ORAL at 09:12

## 2017-07-01 RX ADMIN — HYDROCODONE BITARTRATE AND ACETAMINOPHEN 1 TABLET: 5; 325 TABLET ORAL at 03:23

## 2017-07-01 RX ADMIN — APIXABAN 10 MG: 5 TABLET, FILM COATED ORAL at 09:14

## 2017-07-01 RX ADMIN — Medication 10 ML: at 21:16

## 2017-07-01 RX ADMIN — HYDROMORPHONE HYDROCHLORIDE 2 MG: 1 INJECTION, SOLUTION INTRAMUSCULAR; INTRAVENOUS; SUBCUTANEOUS at 23:02

## 2017-07-01 RX ADMIN — Medication 10 ML: at 13:05

## 2017-07-01 RX ADMIN — Medication 10 ML: at 07:11

## 2017-07-01 RX ADMIN — Medication 4 MG: at 13:05

## 2017-07-01 RX ADMIN — Medication 4 MG: at 03:41

## 2017-07-01 RX ADMIN — Medication 4 MG: at 07:11

## 2017-07-01 RX ADMIN — Medication 4 MG: at 00:26

## 2017-07-01 RX ADMIN — OXYCODONE HYDROCHLORIDE AND ACETAMINOPHEN 2 TABLET: 5; 325 TABLET ORAL at 21:17

## 2017-07-01 RX ADMIN — APIXABAN 10 MG: 5 TABLET, FILM COATED ORAL at 21:17

## 2017-07-01 RX ADMIN — HYDROMORPHONE HYDROCHLORIDE 2 MG: 1 INJECTION, SOLUTION INTRAMUSCULAR; INTRAVENOUS; SUBCUTANEOUS at 18:58

## 2017-07-01 RX ADMIN — Medication 4 MG: at 10:25

## 2017-07-01 RX ADMIN — DEXTROSE MONOHYDRATE AND SODIUM CHLORIDE 100 ML/HR: 5; .9 INJECTION, SOLUTION INTRAVENOUS at 13:02

## 2017-07-01 RX ADMIN — HYDROMORPHONE HYDROCHLORIDE 2 MG: 1 INJECTION, SOLUTION INTRAMUSCULAR; INTRAVENOUS; SUBCUTANEOUS at 14:25

## 2017-07-01 NOTE — H&P
[address] [place] [facility] [place] [address]   HISTORY AND PHYSICAL       Name:  Marshall Ariza   MR#:  [id number]   :  1985   Account #:  [de-identified]        Date of Adm:  2017       CHIEF COMPLAINT: Right shoulder and back pain. HISTORY OF PRESENT ILLNESS: The patient is a healthy 37-year-  old gentleman with no past medical history who presents with 2 to 3   days of shortness of breath and right shoulder pain. The patient states   that a couple days ago he went to a friend's apartment and was   walking upstairs when he noticed that he was increasingly short of   breath which is unusual for him. When he sat down on the couch in his   friend's apartment he noticed sharp pains in his back radiating up to his   shoulder. He tried to tough out the pain though it kept recurring over   the next few days. This weekend he drove down from [place]   where he lived to [place], to visit his mother. He noticed that he   started developing pleuritic pain with deep breathing in his back and   shoulder. When he arrived here he noticed that he felt increasingly   dizzy and like he was going to pass out, though he never lost   consciousness. He has not had any chest pain, palpitations, sweating,   calf pain or leg swelling. The patient denies any personal or family   history of blood clots, early myocardial infarction or stroke. He has no   recent long stance travel besides his drive down from [place]   that occurred after his symptoms began. He has not had any personal   history of cancer. No weight loss. No night sweats, no fatigue. No new   medications that he is aware of. He takes no hormonal therapy. He   notes he had an asbestos exposure in . PAST MEDICAL HISTORY: None. MEDICATIONS: None. ALLERGIES: NONE. REVIEW OF SYSTEMS: The patient denies any headache, vision   changes, focal numbness or weakness.  Further he denies any nausea,   vomiting, abdominal pain, or diarrhea. Otherwise 10-point review of   systems is negative except for as mentioned in the HPI. FAMILY HISTORY: Family history was reviewed for blood clots, early   myocardial infarction and stroke and was noncontributory as   described. SOCIAL HISTORY: The patient resides in [place] and is a    for a living. No tobacco, no alcohol, no illicit drug use. PHYSICAL EXAMINATION: Temperature 36.9, pulse 88, blood   pressure 131/83, respirations 18, oxygen saturation 99% on room air. GENERAL APPEARANCE: The patient is a well appearing young man   in no acute distress. HEENT: EYES: Pupils equal, round, reactive to light. Extraocular   movements intact. No scleral icterus. No conjunctival pallor. ENT:   Mucous membranes are moist. Oropharynx is benign. NECK: Supple with no jugular venous distention. HEART: Regular rate and rhythm without murmurs, rubs, or gallops. RESPIRATORY EXAM: Lungs are clear to auscultation bilaterally   without rubs. Work of breathing is normal.   GI: Abdomen soft, nontender, nondistended with positive bowel sounds. : No costovertebral angle tenderness. SKIN EXAM: The patient has multiple tattoos, but no rashes, pallor or   jaundice. MUSCULOSKELETAL: Muscle tone and bulk are normal. There is no   cyanosis, clubbing or edema. NEUROLOGICAL: The patient is alert and oriented x3. Cranial nerves 2   through 12 are intact, moving all 4 extremities without focal deficits. SIGNIFICANT LABS: White blood cell count 10.1, hemoglobin 13.2,   platelets 761, BUN 8, creatinine 1.12, INR 1.1, PTT 26.3, D. Dimer is   210. Other studies: An x-ray of the chest shows no acute cardiopulmonary   process. A CTA of the chest shows positivity for bilateral lower lobe   pulmonary emboli with presumed peripheral infarction more on the   right than the left.  An EKG showed sinus rhythm with an S1, Q3, T3.     ASSESSMENT AND PLAN: The patient is a 82-year-old gentleman   who presents with acute pulmonary emboli. 1. Pulmonary emboli. The patient has PE with acute infarction of his   lung parenchyma and he will need admission to the hospital for   treatment with anticoagulation. He was started on treatment with a shot   of Lovenox. I would like to bridge him over to apixaban starting at high   dose this evening. Given the patient's description of nearly passing out,   I think it is important to look for cor pulmonale. To that end I will be   checking an echocardiogram, as well as a troponin and BNP to look for   evidence of right heart strain. On his EKG he does have an S1, Q3, T3   present, indicative of some right heart strain, though is not deviated to   the right axis. I would also like to obtain a lower extremity Doppler to   look for residual DVT as this would increase his risk.  This is the   patient's first episode of thromboembolism and he has no risk factors   that he described and some labs were sent off in the emergency room   for hypercoagulability, though in the setting of an acute infarction I   think we should ignore the protein CNS totals if they are abnormal.         Valerie Helton MD      WT / Darrel Fontanez   D:  06/30/2017   14:49   T:  06/30/2017   20:50   Job #:  [id number]

## 2017-07-01 NOTE — CONSULTS
Hematology/Oncology Consult    REASON FOR CONSULT: Pulmonary embolism  REQUESTED BY: Dr. Patrick Boland: Mr. Lorrie Mccray is a 29 y.o. male who is admitted with pulmonary embolism. He has no other PMH. He developed shortness of breath and right shoulder pain over the proceeding 2-3 days. Also with pleuritic chest pain and dizziness. He ultimately came to the ER and had a CTA chest which revealed pulmonary emboli. Leading up to the blood clot, he had no long distance travel, no surgery, no trauma, or immobilization. He did travel from [place] recently. He was started on Lovenox and then Eliquis. No epistaxis, no hemoptysis, no hematemesis, no blood per rectum, no black or tarry stool. No hematuria. Right now his chest pain persists. Breathing is a little better. No fevers, chills, night sweats. No unexpected weight loss. No new lumps or bumps. No nausea, vomiting, diarrhea, or constipation. No personal or family history of blood clots. He is in the [facility] and also works as a  in [employer].      PMH  None    PSH  None    Allergies  None      Current Facility-Administered Medications   Medication Dose Route Frequency Provider Last Rate Last Dose    sodium chloride (NS) flush 5-10 mL  5-10 mL IntraVENous Q8H Violet Villegas MD   10 mL at 07/01/17 0711    sodium chloride (NS) flush 5-10 mL  5-10 mL IntraVENous PRN Violet Villegas MD        dextrose 5% and 0.9% NaCl infusion  100 mL/hr IntraVENous CONTINUOUS Violet Villegas  mL/hr at 07/01/17 0200 100 mL/hr at 07/01/17 0200    acetaminophen (TYLENOL) tablet 650 mg  650 mg Oral Q4H PRN Violet Villegas MD        HYDROcodone-acetaminophen (NORCO) 5-325 mg per tablet 1 Tab  1 Tab Oral Q4H PRN Violet Villegas MD   1 Tab at 07/01/17 0912    naloxone (NARCAN) injection 0.4 mg  0.4 mg IntraVENous PRN Violet Villegas MD  ondansetron (ZOFRAN) injection 4 mg  4 mg IntraVENous Q4H PRN Dionicio Kirkland MD        docusate sodium (COLACE) capsule 100 mg  100 mg Oral BID Xavier Clifton MD        zolpidem (AMBIEN) tablet 5 mg  5 mg Oral QHS PRN Dionicio Kirkland MD        apixaban (ELIQUIS) tablet 10 mg  10 mg Oral BID Xavier Clifton MD   10 mg at 07/01/17 0914    lidocaine (LIDODERM) 5 % patch 2 Patch  2 Patch TransDERmal Q24H Patience Lamp, NP   2 Patch at 06/30/17 2322    morphine injection 4 mg  4 mg IntraVENous Q3H PRN Patience Lamp, NP   4 mg at 07/01/17 1025       Social History     Social History    Marital status: SINGLE     Spouse name: N/A    Number of children: N/A    Years of education: N/A     Social History Main Topics    Smoking status: Never Smoker    Smokeless tobacco: Never Used    Alcohol use Yes      Comment: \"social\"    Drug use: No    Sexual activity: Yes     Partners: Female     Birth control/ protection: None     Other Topics Concern    None     Social History Narrative    ** Merged History Encounter **            Family History   Problem Relation Age of Onset    Hypertension Mother    Also per HPI. ROS  As per the HPI, otherwise a comprehensive ROS is negative.     Physical Examination:   Visit Vitals    /77 (BP 1 Location: Right arm, BP Patient Position: At rest;Supine)    Pulse 81    Temp 98.5 °F (36.9 °C)    Resp 20    Ht 6' 1\" (1.854 m)    Wt 200 lb (90.7 kg)    SpO2 97%    BMI 26.39 kg/m2     General appearance - alert, well appearing, and in no distress  Mental status - oriented to person, place, and time  Mouth - mucous membranes moist, pharynx normal without lesions  Neck - supple, no significant adenopathy  Lymphatics - no palpable lymphadenopathy, no hepatosplenomegaly  Chest - clear to auscultation, no wheezes, rales or rhonchi, symmetric air entry  Heart - normal rate, regular rhythm, normal S1, S2, no murmurs, rubs, clicks or gallops  Abdomen - soft, nontender, nondistended, no masses or organomegaly, bowel sounds present  Back exam - full range of motion, no tenderness, palpable spasm or pain on motion  Neurological - normal speech, no focal findings or movement disorder noted  Musculoskeletal - no joint tenderness, deformity or swelling  Extremities - peripheral pulses normal, no pedal edema, no clubbing or cyanosis  Skin - normal coloration and turgor, no rashes, no suspicious skin lesions noted    LABS  Lab Results   Component Value Date/Time    WBC 10.1 06/30/2017 12:15 PM    HGB 13.2 06/30/2017 12:15 PM    HCT 41.2 06/30/2017 12:15 PM    PLATELET 607 64/71/8588 12:15 PM    MCV 72.3 06/30/2017 12:15 PM    ABS. NEUTROPHILS 8.0 06/30/2017 12:15 PM     Lab Results   Component Value Date/Time    Sodium 137 07/01/2017 03:25 AM    Potassium 3.7 07/01/2017 03:25 AM    Chloride 104 07/01/2017 03:25 AM    CO2 27 07/01/2017 03:25 AM    Glucose 99 07/01/2017 03:25 AM    BUN 5 07/01/2017 03:25 AM    Creatinine 1.00 07/01/2017 03:25 AM    GFR est AA >60 07/01/2017 03:25 AM    GFR est non-AA >60 07/01/2017 03:25 AM    Calcium 8.6 07/01/2017 03:25 AM     Lab Results   Component Value Date/Time    AST (SGOT) 16 08/07/2013 03:39 PM    Alk. phosphatase 93 08/07/2013 03:39 PM    Protein, total 8.3 08/07/2013 03:39 PM    Albumin 4.0 08/07/2013 03:39 PM    Globulin 4.3 08/07/2013 03:39 PM    A-G Ratio 0.9 08/07/2013 03:39 PM     IMAGING  CTA chest on 6/30/17 with bilateral lower lobe pulmonary emboli with presumed peripheral infarctions. Lower extrem dopplers on 6/30/17 with no DVT. ASSESSMENT  Mr. Davis is a 29 y.o. male who is admitted for evaluation of pulmonary emboli. DISCUSSION/PLAN  1. Pulmonary embolism. This was an unprovoked venous thromboembolism.  Per the 2016 ACCP guidelines for Antithrombotic Therapy for VTE Disease, this should be treated with long term anticoagulation as long as the benefits of anticoagulation outweigh the risks. We discussed these guidelines in detail and the fact that he could be at risk for recurrent blood clots on cessation of anticoagulation. 2. Hypercoagulable state. Workup started by ER. Given his young age and the fact that he lives in [facility], I think it is reasonable for him to check in with an academic center. Will make a referral to [facility]. 3. Choice of anticoagulant. He requires systemic anticoagulation. Options include coumadin, low molecular weight heparin, or novel anticoagulants such as Eliquis. We discussed that trials have showed that this this agent is non-inferior to coumadin with a favorable toxicity profile. We discussed that there is no reversal agent for Eliquis, but in trials it has less bleeding. We also discussed the current class action suites suggesting that bleeding was underrepresented in trials for the novel oral anticoagulants. 4. Pulmonary infarction. Should improve with time. Agree with pain control per hospitalist.     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 I explained the incresed risk of bleeding with trauma and gave him a letter to that effect to be taken into account with his duties. Also discussed with hospitalist attending, Dr. Isabella Rowell.     Nicole Xavier MD

## 2017-07-01 NOTE — PROGRESS NOTES
Assessemnt/Plan:   Daily Progress Note: 2017    Admit date: 2017 10:57 AM    Hospitalist Progress Note   Sagrario Kearns [phone]; Call physician on-call through the  7pm-7am          PCP: None   In Hospital Procedure:   * No surgery found *  Consultants this Hospitalization:   IP CONSULT TO [address] Procedure:   * No surgery found *           NAME:  Han Saenz Sr.      :  1960  MRN:  [id number]      Admission Summary:     Unprovoked PE     Chief Complaint for 2017 ; and pertinent  Interval history / Subjective:       2017 : cont cp, some sense of sob  2017 case discussed both with admitting MD and with hem/oncology consultant on floor Dr. Sera Driscoll  2017 ECG tracing  reviewed by me from admission : nsr, non speicific T and q 3 noted. 2017 CXR film/digital reviewed,   Unremarkable w nl cv [place]. 2017 F/u lab and current lab reviewed 2017 [facility] records reviewed, h/o tonsltis,; else; and pt's hist neg for major medical/surgical issues. 2017 ; iv dilaudid added. Risk benefit weighted. Assessment & Plan:                 PE;   Some residual pain,   Eliquis to be continued. , pain management, minimal since of sob. Code status: full   DVT prophylaxis: on Eliquis    Care Plan discussed with: Patient/Family and Consultant Dr. Sera Driscoll   Disposition: Home w/Family and TBD     Subjective:     ROS      Review of Systems:  No fever, chills, mild cough, cp with deep breaths and cough, no n/v/d/,       Could NOT obtain due to:      Objective:       PHYSICAL EXAM:  Vital signs below          Constitutional:   No acute distress  ,     Alert;           Conversant;      Eyes: anicteric sclerae, moist conjunctivae;    no  lid-lag;    + PERRLA;    Ears, nose,mouth,and throat:   Normal external ears ; oropharynx clear with  moist  mucous membranes; no posterior oral lesions; and  trach is  mid line;         Respiratory: Trachea midline;  CTA   with normal  respiratory effort; and no   intercostal retractions;    Cardiovascular: RRR, no MRGs;    Gastrointestinal: Soft, non-tender ; no  masses or HSM; no  guarding; no  rebound;    Genitourinary:  No scrotal/labial edema,      Musculoskelatal:  extreme ties are supple;  no  peripheral edema;  neck with  FROM; atraumatic    Skin:  Temperature nl ;  turgor is nl  ; the texture nl  ;  no  rash;     Neurologic: CN 2- 12  intact;  no  focal motor weakness;   ;    Psychiatric:  Appropriate  affect; alert  ; and  oriented to person, place , and time;    Heme/Lymph/Immune:  Cervical adenopathy not  present; axillary adenopathy not  present;       VITALS:   Last 24hrs VS reviewed since prior progress note.  Most recent are:  Patient Vitals for the past 24 hrs:   Temp Pulse Resp BP SpO2   07/01/17 0916 98.5 °F (36.9 °C) 81 20 134/77 97 %   07/01/17 0420 98.6 °F (37 °C) 72 16 140/72 99 %   07/01/17 0031 98.8 °F (37.1 °C) 75 18 145/86 99 %   06/30/17 2030 99.8 °F (37.7 °C) 74 18 (!) 158/91 100 %   06/30/17 1653 98.8 °F (37.1 °C) 71 20 151/86 100 %   06/30/17 1600 99.2 °F (37.3 °C) 74 25 151/75 99 %   06/30/17 1530 - 80 21 156/79 99 %   06/30/17 1500 - 74 21 (!) 155/94 100 %   06/30/17 1430 - 81 22 145/72 98 %   06/30/17 1400 - 79 17 134/80 99 %   06/30/17 1357 - - - 134/87 -   06/30/17 1350 - 84 16 146/79 100 %   06/30/17 1300 - 69 16 134/70 99 %   06/30/17 1107 98.4 °F (36.9 °C) 88 18 131/83 99 %       Intake/Output Summary (Last 24 hours) at 07/01/17 1016  Last data filed at 07/01/17 0713   Gross per 24 hour   Intake                0 ml   Output              950 ml   Net             -950 ml       _____________________________  Medicines:    Current Facility-Administered Medications:     sodium chloride (NS) flush 5-10 mL, 5-10 mL, IntraVENous, Q8H, Elia Clifton MD, 10 mL at 07/01/17 0711    sodium chloride (NS) flush 5-10 mL, 5-10 mL, IntraVENous, PRN, Elia Clifton, MD    dextrose 5% and 0.9% NaCl infusion, 100 mL/hr, IntraVENous, CONTINUOUS, Jerrell Paz MD, Last Rate: 100 mL/hr at 07/01/17 0200, 100 mL/hr at 07/01/17 0200    acetaminophen (TYLENOL) tablet 650 mg, 650 mg, Oral, Q4H PRN, Jerrell Paz MD    HYDROcodone-acetaminophen (NORCO) 5-325 mg per tablet 1 Tab, 1 Tab, Oral, Q4H PRN, Jerrell Paz MD, 1 Tab at 07/01/17 0912    naloxone (NARCAN) injection 0.4 mg, 0.4 mg, IntraVENous, PRN, Jerrell Paz MD    ondansetron (ZOFRAN) injection 4 mg, 4 mg, IntraVENous, Q4H PRN, Jerrell Paz MD    docusate sodium (COLACE) capsule 100 mg, 100 mg, Oral, BID, Gordon Clifton MD    zolpidem (AMBIEN) tablet 5 mg, 5 mg, Oral, QHS PRN, Jerrell Paz MD    apixaban (ELIQUIS) tablet 10 mg, 10 mg, Oral, BID, Gordon Clifton MD, 10 mg at 07/01/17 0914    lidocaine (LIDODERM) 5 % patch 2 Patch, 2 Patch, TransDERmal, Q24H, Kelsey Lunsford, NP, 2 Patch at 06/30/17 2322    morphine injection 4 mg, 4 mg, IntraVENous, Q3H PRN, Kelsey Lunsford, NP, 4 mg at 07/01/17 5221  Procedures: see electronic medical records for all procedures/Xrays and details which were not copied into this note but were reviewed prior to creation of Plan. LABS:  Recent Labs      06/30/17   1215   WBC  10.1   HGB  13.2   HCT  41.2   PLT  234     Recent Labs      07/01/17   0325  06/30/17   1215   NA  137  137   K  3.7  4.1   CL  104  102   CO2  27  30   BUN  5*  8   CREA  1.00  1.12   GLU  99  87   CA  8.6  9.4     No results for input(s): SGOT, GPT, ALT, AP, TBIL, TBILI, TP, ALB, GLOB, GGT, AML, LPSE in the last 72 hours. No lab exists for component: AMYP, HLPSE  Recent Labs      06/30/17   1215   INR  1.1   PTP  11.2*   APTT  26.3      No results for input(s): FE, TIBC, PSAT, FERR in the last 72 hours. No results found for: FOL, RBCF   No results for input(s): PH, PCO2, PO2 in the last 72 hours.   No results for input(s): PHI, PO2I, PCO2I in the last 72 hours. Recent Labs      06/30/17   1215   TROIQ  <0.04     No results found for: CHOL, CHOLX, CHLST, CHOLV, HDL, LDL, LDLC, DLDLP, TGLX, TRIGL, TRIGP, CHHD, CHHDX  No results found for: [facility]  Lab Results   Component Value Date/Time    Color YELLOW/STRAW 08/07/2013 05:35 PM    Appearance CLEAR 08/07/2013 05:35 PM    Specific gravity 1.009 08/07/2013 05:35 PM    pH (UA) 7.5 08/07/2013 05:35 PM    Protein NEGATIVE  08/07/2013 05:35 PM    Glucose NEGATIVE  08/07/2013 05:35 PM    Ketone NEGATIVE  08/07/2013 05:35 PM    Bilirubin NEGATIVE  08/07/2013 05:35 PM    Urobilinogen 0.2 08/07/2013 05:35 PM    Nitrites NEGATIVE  08/07/2013 05:35 PM    Leukocyte Esterase NEGATIVE  08/07/2013 05:35 PM    Epithelial cells 0-5 08/07/2013 05:35 PM    Bacteria NEGATIVE  08/07/2013 05:35 PM    WBC 0-4 08/07/2013 05:35 PM    RBC 0-5 08/07/2013 05:35 PM           CULTURES:    No results found for: SDES No results found for: CULT     CT Results (most recent):    Results from [place] encounter on 06/30/17   CTA CHEST W OR W WO CONT   Narrative EXAM:  CTA CHEST W OR W WO CONT    INDICATION:  Right-sided pleuritic pain for 2 days and elevated d dimer    COMPARISON: None. CONTRAST:  85 mL of Isovue-370. TECHNIQUE:   Precontrast  images were obtained to localize the volume for acquisition. Multislice helical CT arteriography was performed from the diaphragm to the  thoracic inlet during uneventful rapid bolus intravenous contrast  administration. Lung and soft tissue windows were generated. Coronal and  sagittal images were generated and 3D post processing consisting of coronal  maximum intensity images was performed. CT dose reduction was achieved through  use of a standardized protocol tailored for this examination and automatic  exposure control for dose modulation. FINDINGS:  The lungs are clear of mass, nodule, airspace disease or edema.     There are emboli extending into the right lower lobe with associated moderate  peripheral airspace disease. A few peripheral emboli are noted in the inferior  peripheral branches of the left lower lobe with associated minimal peripheral  airspace disease. There is no mediastinal or hilar adenopathy or mass. The aorta  enhances normally without evidence of aneurysm or dissection. The visualized portions of the upper abdominal organs are normal.         Impression IMPRESSION:     Positive bilateral lower lobe pulmonary emboli with presumed peripheral  infarctions as described. [place] more significant on the right. This result was verbally relayed to Dayo Thomas at 1349 hours by myself.   789                (Use of word \"will\" = I will, or I did, or done by me, or to be done by me)  Armida Spears MD

## 2017-07-02 LAB
ALBUMIN SERPL BCP-MCNC: 3.1 G/DL (ref 3.5–5)
ALBUMIN/GLOB SERPL: 0.6 {RATIO} (ref 1.1–2.2)
ALP SERPL-CCNC: 70 U/L (ref 45–117)
ALT SERPL-CCNC: 22 U/L (ref 12–78)
ANION GAP BLD CALC-SCNC: 5 MMOL/L (ref 5–15)
ARTERIAL PATENCY WRIST A: YES
AST SERPL W P-5'-P-CCNC: 8 U/L (ref 15–37)
BASE EXCESS BLD CALC-SCNC: 4 MMOL/L
BASOPHILS # BLD AUTO: 0 K/UL (ref 0–0.1)
BASOPHILS # BLD: 0 % (ref 0–1)
BDY SITE: ABNORMAL
BILIRUB SERPL-MCNC: 0.8 MG/DL (ref 0.2–1)
BUN SERPL-MCNC: 5 MG/DL (ref 6–20)
BUN/CREAT SERPL: 6 (ref 12–20)
CALCIUM SERPL-MCNC: 9 MG/DL (ref 8.5–10.1)
CHLORIDE SERPL-SCNC: 101 MMOL/L (ref 97–108)
CO2 SERPL-SCNC: 29 MMOL/L (ref 21–32)
CREAT SERPL-MCNC: 0.87 MG/DL (ref 0.7–1.3)
DIFFERENTIAL METHOD BLD: ABNORMAL
DRVVT MIX, 117894: 44.7 SEC (ref 0–47)
EOSINOPHIL # BLD: 0.1 K/UL (ref 0–0.4)
EOSINOPHIL NFR BLD: 1 % (ref 0–7)
ERYTHROCYTE [DISTWIDTH] IN BLOOD BY AUTOMATED COUNT: 14.2 % (ref 11.5–14.5)
GAS FLOW.O2 O2 DELIVERY SYS: ABNORMAL L/MIN
GAS FLOW.O2 SETTING OXYMISER: 2 L/M
GLOBULIN SER CALC-MCNC: 5 G/DL (ref 2–4)
GLUCOSE SERPL-MCNC: 92 MG/DL (ref 65–100)
HCO3 BLD-SCNC: 28.5 MMOL/L (ref 22–26)
HCT VFR BLD AUTO: 37.3 % (ref 36.6–50.3)
HGB BLD-MCNC: 11.9 G/DL (ref 12.1–17)
LYMPHOCYTES # BLD AUTO: 10 % (ref 12–49)
LYMPHOCYTES # BLD: 0.9 K/UL (ref 0.8–3.5)
MCH RBC QN AUTO: 23 PG (ref 26–34)
MCHC RBC AUTO-ENTMCNC: 31.9 G/DL (ref 30–36.5)
MCV RBC AUTO: 72 FL (ref 80–99)
MONOCYTES # BLD: 1.7 K/UL (ref 0–1)
MONOCYTES NFR BLD AUTO: 18 % (ref 5–13)
NEUTS SEG # BLD: 6.7 K/UL (ref 1.8–8)
NEUTS SEG NFR BLD AUTO: 71 % (ref 32–75)
PCO2 BLD: 47.6 MMHG (ref 35–45)
PH BLD: 7.38 [PH] (ref 7.35–7.45)
PLATELET # BLD AUTO: 213 K/UL (ref 150–400)
PO2 BLD: 98 MMHG (ref 80–100)
POTASSIUM SERPL-SCNC: 3.7 MMOL/L (ref 3.5–5.1)
PROT C AG PPP IA-ACNC: 97 % (ref 60–150)
PROT SERPL-MCNC: 8.1 G/DL (ref 6.4–8.2)
RBC # BLD AUTO: 5.18 M/UL (ref 4.1–5.7)
RBC MORPH BLD: ABNORMAL
RBC MORPH BLD: ABNORMAL
SAO2 % BLD: 97 % (ref 92–97)
SODIUM SERPL-SCNC: 135 MMOL/L (ref 136–145)
SPECIMEN TYPE: ABNORMAL
WBC # BLD AUTO: 9.4 K/UL (ref 4.1–11.1)

## 2017-07-02 PROCEDURE — 74011250636 HC RX REV CODE- 250/636: Performed by: INTERNAL MEDICINE

## 2017-07-02 PROCEDURE — 74011250637 HC RX REV CODE- 250/637: Performed by: INTERNAL MEDICINE

## 2017-07-02 PROCEDURE — 65660000000 HC RM CCU STEPDOWN

## 2017-07-02 PROCEDURE — 80053 COMPREHEN METABOLIC PANEL: CPT | Performed by: INTERNAL MEDICINE

## 2017-07-02 PROCEDURE — 36600 WITHDRAWAL OF ARTERIAL BLOOD: CPT

## 2017-07-02 PROCEDURE — 82803 BLOOD GASES ANY COMBINATION: CPT

## 2017-07-02 PROCEDURE — 36415 COLL VENOUS BLD VENIPUNCTURE: CPT | Performed by: INTERNAL MEDICINE

## 2017-07-02 PROCEDURE — 74011250637 HC RX REV CODE- 250/637: Performed by: NURSE PRACTITIONER

## 2017-07-02 PROCEDURE — 74011250637 HC RX REV CODE- 250/637: Performed by: HOSPITALIST

## 2017-07-02 PROCEDURE — 85025 COMPLETE CBC W/AUTO DIFF WBC: CPT | Performed by: INTERNAL MEDICINE

## 2017-07-02 RX ORDER — SODIUM CHLORIDE 9 MG/ML
125 INJECTION, SOLUTION INTRAVENOUS CONTINUOUS
Status: DISCONTINUED | OUTPATIENT
Start: 2017-07-02 | End: 2017-07-04

## 2017-07-02 RX ADMIN — OXYCODONE HYDROCHLORIDE AND ACETAMINOPHEN 2 TABLET: 5; 325 TABLET ORAL at 22:51

## 2017-07-02 RX ADMIN — SODIUM CHLORIDE 125 ML/HR: 900 INJECTION, SOLUTION INTRAVENOUS at 20:28

## 2017-07-02 RX ADMIN — Medication 10 ML: at 05:59

## 2017-07-02 RX ADMIN — Medication 10 ML: at 20:28

## 2017-07-02 RX ADMIN — ACETAMINOPHEN 650 MG: 325 TABLET, FILM COATED ORAL at 20:37

## 2017-07-02 RX ADMIN — HYDROMORPHONE HYDROCHLORIDE 2 MG: 1 INJECTION, SOLUTION INTRAMUSCULAR; INTRAVENOUS; SUBCUTANEOUS at 20:28

## 2017-07-02 RX ADMIN — APIXABAN 10 MG: 5 TABLET, FILM COATED ORAL at 20:28

## 2017-07-02 RX ADMIN — OXYCODONE HYDROCHLORIDE AND ACETAMINOPHEN 2 TABLET: 5; 325 TABLET ORAL at 14:50

## 2017-07-02 RX ADMIN — HYDROMORPHONE HYDROCHLORIDE 2 MG: 1 INJECTION, SOLUTION INTRAMUSCULAR; INTRAVENOUS; SUBCUTANEOUS at 07:41

## 2017-07-02 RX ADMIN — APIXABAN 10 MG: 5 TABLET, FILM COATED ORAL at 08:40

## 2017-07-02 RX ADMIN — Medication 10 ML: at 14:20

## 2017-07-02 RX ADMIN — ACETAMINOPHEN 650 MG: 325 TABLET, FILM COATED ORAL at 03:43

## 2017-07-02 RX ADMIN — HYDROMORPHONE HYDROCHLORIDE 2 MG: 1 INJECTION, SOLUTION INTRAMUSCULAR; INTRAVENOUS; SUBCUTANEOUS at 12:22

## 2017-07-02 RX ADMIN — HYDROMORPHONE HYDROCHLORIDE 2 MG: 1 INJECTION, SOLUTION INTRAMUSCULAR; INTRAVENOUS; SUBCUTANEOUS at 16:28

## 2017-07-02 RX ADMIN — OXYCODONE HYDROCHLORIDE AND ACETAMINOPHEN 2 TABLET: 5; 325 TABLET ORAL at 10:36

## 2017-07-02 RX ADMIN — OXYCODONE HYDROCHLORIDE AND ACETAMINOPHEN 2 TABLET: 5; 325 TABLET ORAL at 18:49

## 2017-07-02 RX ADMIN — Medication 10 ML: at 16:29

## 2017-07-02 RX ADMIN — OXYCODONE HYDROCHLORIDE AND ACETAMINOPHEN 2 TABLET: 5; 325 TABLET ORAL at 05:57

## 2017-07-02 RX ADMIN — SODIUM CHLORIDE 125 ML/HR: 900 INJECTION, SOLUTION INTRAVENOUS at 11:36

## 2017-07-02 RX ADMIN — SODIUM CHLORIDE 1000 ML: 900 INJECTION, SOLUTION INTRAVENOUS at 11:35

## 2017-07-02 RX ADMIN — HYDROMORPHONE HYDROCHLORIDE 2 MG: 1 INJECTION, SOLUTION INTRAMUSCULAR; INTRAVENOUS; SUBCUTANEOUS at 03:25

## 2017-07-02 NOTE — PROGRESS NOTES
CM met with patient due to him not having a PCP. He was given a list of BS PCP's and will tell the nurse when he has chosen one. From there we can call and make follow up appointment upon discharge.     Bella Martines RN CRM

## 2017-07-02 NOTE — PROGRESS NOTES
Purposeful hourly rounding performed, patient stable at the time of handoff report. Bedside and Verbal shift change report given to AILYN Sanchez (oncoming nurse) by Al Wayne RN (offgoing nurse). Report included the following information SBAR, Kardex, ED Summary, Procedure Summary, Intake/Output, MAR and Recent Results.

## 2017-07-02 NOTE — PROGRESS NOTES
Assessemnt/Plan:   Daily Progress Note: 2017    Admit date: 2017 10:57 AM    Hospitalist Progress Note   Maximilian Ward Stacks [phone]; Call physician on-call through the  7pm-7am          PCP: None   In Hospital Procedure:   * No surgery found *  Consultants this Hospitalization:   IP CONSULT TO [address] Procedure:   * No surgery found *           NAME:  Madhav Reveles Sr.      :  1960  MRN:  [id number]      Admission Summary:     Unprovoked PE     Chief Complaint for 2017 ; and pertinent  Interval history / Subjective:     see below:     Assessment & Plan:      2017 concern and/or interventions/findings; additions to assess/plans below. · New problem: Hemptysis, , for f/u cxr,  Tele, abg and cbc/cmp. · CXR reviewed by me,  Atel, ? carlitos infiltrates, will f/u with CXR in am 7/3, f/u test for today as on cbc, CMP and ABG's requested. · New problem, pain on swallowing, , pt not able to eat well /drink well 2n2 pain, will f/u cxr and increase pain mangemetn iv dilaudid and ivf bolus and ivf continuous 2017                 PE;   Some residual pain,   Eliquis to be continued. , pain management, minimal since of sob. Hemoptysis, w/u and f/u planned ( see above)    Code status: full   DVT prophylaxis: on Eliquis    Care Plan discussed with: Patient/Family and Consultant Dr. Michael Keating   Disposition: Home w/Family and TBD     Subjective:     ROS      Review of Systems:  No fever, chills, mild cough, cp with deep breaths and cough, no n/v/d/, pain on swallowing and hemoptysis.        Could NOT obtain due to:      Objective:       PHYSICAL EXAM:  Vital signs below          Constitutional:   No acute distress  ,     Alert;           Conversant;      Eyes: anicteric sclerae, moist conjunctivae;    no  lid-lag;    + PERRLA;    Ears, nose,mouth,and throat:   Normal external ears ; oropharynx clear with  moist  mucous membranes; no posterior oral lesions; and trach is  mid line; Respiratory:   Diminished but else Trachea midline;  CTA   with normal  respiratory effort; and no   intercostal retractions;    Cardiovascular: RRR, no MRGs;    Gastrointestinal: Soft, non-tender ; no  masses or HSM; no  guarding; no  rebound;    Genitourinary:  No scrotal/labial edema,      Musculoskelatal:  extreme ties are supple;  no  peripheral edema;  neck with  FROM; atraumatic    Skin:  Temperature nl ;  turgor is nl  ; the texture nl  ;  no  rash;     Neurologic: CN 2- 12  intact;  no  focal motor weakness;   ;    Psychiatric:  Appropriate  affect; alert  ; and  oriented to person, place , and time;    Heme/Lymph/Immune:  Cervical adenopathy not  present; axillary adenopathy not  present;       VITALS:   Last 24hrs VS reviewed since prior progress note.  Most recent are:  Patient Vitals for the past 24 hrs:   Temp Pulse Resp BP SpO2   07/02/17 0825 98.5 °F (36.9 °C) 83 20 (!) 148/91 95 %   07/02/17 0500 99.2 °F (37.3 °C) - - - -   07/02/17 0336 (!) 100.9 °F (38.3 °C) 90 22 150/90 94 %   07/01/17 2346 97.9 °F (36.6 °C) 82 18 (!) 149/92 95 %   07/01/17 2008 99.7 °F (37.6 °C) 83 18 146/78 100 %   07/01/17 1534 99.8 °F (37.7 °C) 84 22 150/80 99 %       Intake/Output Summary (Last 24 hours) at 07/02/17 1118  Last data filed at 07/02/17 0701   Gross per 24 hour   Intake                0 ml   Output              700 ml   Net             -700 ml       _____________________________  Medicines:    Current Facility-Administered Medications:     HYDROmorphone (PF) (DILAUDID) injection 2 mg, 2 mg, IntraVENous, Q4H PRN, Mirian Valladares MD, 2 mg at 07/02/17 0741    oxyCODONE-acetaminophen (PERCOCET) 5-325 mg per tablet 1-2 Tab, 1-2 Tab, Oral, Q4H PRN, Mirian Valladares MD, 2 Tab at 07/02/17 1036    sodium chloride (NS) flush 5-10 mL, 5-10 mL, IntraVENous, Q8H, Faustino Clifton MD, 10 mL at 07/02/17 0559    sodium chloride (NS) flush 5-10 mL, 5-10 mL, IntraVENous, PRN, Faustino Schreiber MD Elodia    acetaminophen (TYLENOL) tablet 650 mg, 650 mg, Oral, Q4H PRN, Demetrius Eduin Clifton MD, 650 mg at 07/02/17 0343    naloxone (NARCAN) injection 0.4 mg, 0.4 mg, IntraVENous, PRN, Rae Carrero MD    ondansetron [facility]) injection 4 mg, 4 mg, IntraVENous, Q4H PRN, Rae Carrero MD    docusate sodium (COLACE) capsule 100 mg, 100 mg, Oral, BID, Rae Carrero MD, 100 mg at 07/01/17 1730    zolpidem (AMBIEN) tablet 5 mg, 5 mg, Oral, QHS PRN, Rae Carrero MD    apixaban (ELIQUIS) tablet 10 mg, 10 mg, Oral, BID, Demetrius Eduin Clifton MD, 10 mg at 07/02/17 0840    lidocaine (LIDODERM) 5 % patch 2 Patch, 2 Patch, TransDERmal, Q24H, Lacy Plata NP, 2 Patch at 07/02/17 0003  Procedures: see electronic medical records for all procedures/Xrays and details which were not copied into this note but were reviewed prior to creation of Plan. LABS:  Recent Labs      06/30/17   1215   WBC  10.1   HGB  13.2   HCT  41.2   PLT  234     Recent Labs      07/01/17   0325  06/30/17   1215   NA  137  137   K  3.7  4.1   CL  104  102   CO2  27  30   BUN  5*  8   CREA  1.00  1.12   GLU  99  87   CA  8.6  9.4     No results for input(s): SGOT, GPT, ALT, AP, TBIL, TBILI, TP, ALB, GLOB, GGT, AML, LPSE in the last 72 hours. No lab exists for component: AMYP, HLPSE  Recent Labs      06/30/17   1215   INR  1.1   PTP  11.2*   APTT  26.3      No results for input(s): FE, TIBC, PSAT, FERR in the last 72 hours. No results found for: FOL, RBCF   No results for input(s): PH, PCO2, PO2 in the last 72 hours. No results for input(s): PHI, PO2I, PCO2I in the last 72 hours.   Recent Labs      06/30/17   1215   TROIQ  <0.04     No results found for: CHOL, CHOLX, CHLST, CHOLV, HDL, LDL, LDLC, DLDLP, TGLX, TRIGL, TRIGP, CHHD, CHHDX  No results found for: [address]  [employer]  Lab Results   Component Value Date/Time    Color YELLOW/STRAW 08/07/2013 05:35 PM    Appearance CLEAR 08/07/2013 05:35 PM    Specific gravity 1.009 08/07/2013 05:35 PM    pH (UA) 7.5 08/07/2013 05:35 PM    Protein NEGATIVE  08/07/2013 05:35 PM    Glucose NEGATIVE  08/07/2013 05:35 PM    Ketone NEGATIVE  08/07/2013 05:35 PM    Bilirubin NEGATIVE  08/07/2013 05:35 PM    Urobilinogen 0.2 08/07/2013 05:35 PM    Nitrites NEGATIVE  08/07/2013 05:35 PM    Leukocyte Esterase NEGATIVE  08/07/2013 05:35 PM    Epithelial cells 0-5 08/07/2013 05:35 PM    Bacteria NEGATIVE  08/07/2013 05:35 PM    WBC 0-4 08/07/2013 05:35 PM    RBC 0-5 08/07/2013 05:35 PM           CULTURES:    No results found for: SDES No results found for: CULT     CT Results (most recent):    Results from [place] encounter on 06/30/17   CTA CHEST W OR W WO CONT   Narrative EXAM:  CTA CHEST W OR W WO CONT    INDICATION:  Right-sided pleuritic pain for 2 days and elevated d dimer    COMPARISON: None. CONTRAST:  85 mL of Isovue-370. TECHNIQUE:   Precontrast  images were obtained to localize the volume for acquisition. Multislice helical CT arteriography was performed from the diaphragm to the  thoracic inlet during uneventful rapid bolus intravenous contrast  administration. Lung and soft tissue windows were generated. Coronal and  sagittal images were generated and 3D post processing consisting of coronal  maximum intensity images was performed. CT dose reduction was achieved through  use of a standardized protocol tailored for this examination and automatic  exposure control for dose modulation. FINDINGS:  The lungs are clear of mass, nodule, airspace disease or edema. There are emboli extending into the right lower lobe with associated moderate  peripheral airspace disease. A few peripheral emboli are noted in the inferior  peripheral branches of the left lower lobe with associated minimal peripheral  airspace disease. There is no mediastinal or hilar adenopathy or mass.  The aorta  enhances normally without evidence of aneurysm or dissection. The visualized portions of the upper abdominal organs are normal.         Impression IMPRESSION:     Positive bilateral lower lobe pulmonary emboli with presumed peripheral  infarctions as described. [place] more significant on the right. This result was verbally relayed to Armen Centeno at 1349 hours by myself.   789                (Use of word \"will\" = I will, or I did, or done by me, or to be done by me)  Ольга Eldridge MD

## 2017-07-02 NOTE — PROGRESS NOTES
Bedside shift change report given to [facility] RN (oncoming nurse) by Niesha Ross RN (offgoing nurse). Report included the following information SBAR, Kardex, Intake/Output, MAR, Recent Results and Cardiac Rhythm NSR.

## 2017-07-02 NOTE — PROGRESS NOTES
I returned page from nurse who reports patient having coughing up small amount of blood. Patient reportedly is in no acute respiratory distress with O2sat= 100%. I will order portable chest xray stat.

## 2017-07-03 ENCOUNTER — APPOINTMENT (OUTPATIENT)
Dept: GENERAL RADIOLOGY | Age: 32
DRG: 175 | End: 2017-07-03
Attending: INTERNAL MEDICINE
Payer: OTHER GOVERNMENT

## 2017-07-03 LAB
BASOPHILS # BLD AUTO: 0 K/UL (ref 0–0.1)
BASOPHILS # BLD: 0 % (ref 0–1)
CRP SERPL-MCNC: 24.8 MG/DL (ref 0–0.6)
DIFFERENTIAL METHOD BLD: ABNORMAL
EOSINOPHIL # BLD: 0.1 K/UL (ref 0–0.4)
EOSINOPHIL NFR BLD: 1 % (ref 0–7)
ERYTHROCYTE [DISTWIDTH] IN BLOOD BY AUTOMATED COUNT: 14.3 % (ref 11.5–14.5)
ERYTHROCYTE [SEDIMENTATION RATE] IN BLOOD: 29 MM/HR (ref 0–15)
HCT VFR BLD AUTO: 39 % (ref 36.6–50.3)
HGB BLD-MCNC: 12.5 G/DL (ref 12.1–17)
LACTATE SERPL-SCNC: 1.2 MMOL/L (ref 0.4–2)
LYMPHOCYTES # BLD AUTO: 10 % (ref 12–49)
LYMPHOCYTES # BLD: 1 K/UL (ref 0.8–3.5)
MCH RBC QN AUTO: 23 PG (ref 26–34)
MCHC RBC AUTO-ENTMCNC: 32.1 G/DL (ref 30–36.5)
MCV RBC AUTO: 71.8 FL (ref 80–99)
MONOCYTES # BLD: 1.2 K/UL (ref 0–1)
MONOCYTES NFR BLD AUTO: 12 % (ref 5–13)
NEUTS SEG # BLD: 7.5 K/UL (ref 1.8–8)
NEUTS SEG NFR BLD AUTO: 77 % (ref 32–75)
PLATELET # BLD AUTO: 246 K/UL (ref 150–400)
RBC # BLD AUTO: 5.43 M/UL (ref 4.1–5.7)
RBC MORPH BLD: ABNORMAL
WBC # BLD AUTO: 9.8 K/UL (ref 4.1–11.1)

## 2017-07-03 PROCEDURE — 74011250636 HC RX REV CODE- 250/636: Performed by: INTERNAL MEDICINE

## 2017-07-03 PROCEDURE — 74011000258 HC RX REV CODE- 258: Performed by: INTERNAL MEDICINE

## 2017-07-03 PROCEDURE — 83605 ASSAY OF LACTIC ACID: CPT | Performed by: INTERNAL MEDICINE

## 2017-07-03 PROCEDURE — 65660000000 HC RM CCU STEPDOWN

## 2017-07-03 PROCEDURE — 86140 C-REACTIVE PROTEIN: CPT | Performed by: INTERNAL MEDICINE

## 2017-07-03 PROCEDURE — 85652 RBC SED RATE AUTOMATED: CPT | Performed by: INTERNAL MEDICINE

## 2017-07-03 PROCEDURE — 71020 XR CHEST PA LAT: CPT

## 2017-07-03 PROCEDURE — 74011250637 HC RX REV CODE- 250/637: Performed by: HOSPITALIST

## 2017-07-03 PROCEDURE — 74011250637 HC RX REV CODE- 250/637: Performed by: INTERNAL MEDICINE

## 2017-07-03 PROCEDURE — 85025 COMPLETE CBC W/AUTO DIFF WBC: CPT | Performed by: INTERNAL MEDICINE

## 2017-07-03 PROCEDURE — 36415 COLL VENOUS BLD VENIPUNCTURE: CPT | Performed by: INTERNAL MEDICINE

## 2017-07-03 RX ADMIN — PIPERACILLIN SODIUM,TAZOBACTAM SODIUM 3.38 G: 3; .375 INJECTION, POWDER, FOR SOLUTION INTRAVENOUS at 22:20

## 2017-07-03 RX ADMIN — HYDROMORPHONE HYDROCHLORIDE 2 MG: 1 INJECTION, SOLUTION INTRAMUSCULAR; INTRAVENOUS; SUBCUTANEOUS at 20:18

## 2017-07-03 RX ADMIN — SODIUM CHLORIDE 125 ML/HR: 900 INJECTION, SOLUTION INTRAVENOUS at 21:05

## 2017-07-03 RX ADMIN — APIXABAN 10 MG: 5 TABLET, FILM COATED ORAL at 22:18

## 2017-07-03 RX ADMIN — OXYCODONE HYDROCHLORIDE AND ACETAMINOPHEN 2 TABLET: 5; 325 TABLET ORAL at 22:17

## 2017-07-03 RX ADMIN — Medication 10 ML: at 22:20

## 2017-07-03 RX ADMIN — OXYCODONE HYDROCHLORIDE AND ACETAMINOPHEN 2 TABLET: 5; 325 TABLET ORAL at 17:02

## 2017-07-03 RX ADMIN — HYDROMORPHONE HYDROCHLORIDE 2 MG: 1 INJECTION, SOLUTION INTRAMUSCULAR; INTRAVENOUS; SUBCUTANEOUS at 01:27

## 2017-07-03 RX ADMIN — HYDROMORPHONE HYDROCHLORIDE 2 MG: 1 INJECTION, SOLUTION INTRAMUSCULAR; INTRAVENOUS; SUBCUTANEOUS at 05:04

## 2017-07-03 RX ADMIN — PIPERACILLIN SODIUM,TAZOBACTAM SODIUM 3.38 G: 3; .375 INJECTION, POWDER, FOR SOLUTION INTRAVENOUS at 13:52

## 2017-07-03 RX ADMIN — HYDROMORPHONE HYDROCHLORIDE 2 MG: 1 INJECTION, SOLUTION INTRAMUSCULAR; INTRAVENOUS; SUBCUTANEOUS at 09:52

## 2017-07-03 RX ADMIN — APIXABAN 10 MG: 5 TABLET, FILM COATED ORAL at 09:52

## 2017-07-03 RX ADMIN — HYDROMORPHONE HYDROCHLORIDE 2 MG: 1 INJECTION, SOLUTION INTRAMUSCULAR; INTRAVENOUS; SUBCUTANEOUS at 14:40

## 2017-07-03 RX ADMIN — OXYCODONE HYDROCHLORIDE AND ACETAMINOPHEN 2 TABLET: 5; 325 TABLET ORAL at 12:17

## 2017-07-03 RX ADMIN — DOCUSATE SODIUM 100 MG: 100 CAPSULE, LIQUID FILLED ORAL at 09:52

## 2017-07-03 RX ADMIN — SODIUM CHLORIDE 125 ML/HR: 900 INJECTION, SOLUTION INTRAVENOUS at 04:59

## 2017-07-03 RX ADMIN — Medication 10 ML: at 13:51

## 2017-07-03 RX ADMIN — SODIUM CHLORIDE 125 ML/HR: 900 INJECTION, SOLUTION INTRAVENOUS at 13:51

## 2017-07-03 RX ADMIN — DOCUSATE SODIUM 100 MG: 100 CAPSULE, LIQUID FILLED ORAL at 18:45

## 2017-07-03 NOTE — PROGRESS NOTES
Problem: Discharge Planning  Goal: *Discharge to safe environment  Outcome: Progressing Towards Goal  Discharge disposition: Home.      FLACO VelascoW/CRM

## 2017-07-03 NOTE — PROGRESS NOTES
Patient noted with hemoptysis x2. Small amount noted in emesis bag. Patient asymptomatic but remains with a lot of pain. Call placed to Team 7, Dr. Milly Goldstein and made him aware of patient issue. MD stated he's on his way patient. Will continue to monitor.

## 2017-07-03 NOTE — CDMP QUERY
Dr. Liliana Pool,     Please clarify if this patient is being treated/managed for:    =>Possible gram negative bacterial pneumonia in the setting of PE and hospitalization requiring IV abxs  =>Other Explanation of clinical findings  =>Unable to Determine (no explanation of clinical findings)    The medical record reflects the following clinical findings, treatment, and risk factors:    Risk Factors: 28 y/o pt  Clinical Indicators: Hcap, hemoptysis  Treatment: IV Abxs    Please clarify and document your clinical opinion in the progress notes and discharge summary including the definitive and/or presumptive diagnosis, (suspected or probable), related to the above clinical findings. Please include clinical findings supporting your diagnosis.     Thank you,   Daron Guerrero, [address] [facility]

## 2017-07-03 NOTE — PROGRESS NOTES
Assessemnt/Plan:   Daily Progress Note: 7/3/2017    Admit date: 2017 10:57 AM    Hospitalist Progress Note   Krystian Hodge, Tonya Fam [phone]; Call physician on-call through the  7pm-7am          PCP: None   In Hospital Procedure:   * No surgery found *  Consultants this Hospitalization:   IP CONSULT TO [address] Procedure:   * No surgery found *           NAME:  Mamie Stone Sr.      :  1960  MRN:  [id number]      Admission Summary:     Unprovoked PE     Chief Complaint for 7/3/2017 ; and pertinent  Interval history / Subjective:      7/3/2017 concern and/or interventions/findings; additions to assess/plans below. ·   CXR f/u reviewed by me not inconsistent with atelectasis but concerns for pna, moises rt lower present. · Review of cxr report suggest scattered air space opacities. · Temp up , left shift and elevated CRP point to possible hCAP, Zosyn started. · New problem;  PNA likely HCAP, starting Zosyn , see abve. Assessment & Plan:               PE;   Some residual pain,   Eliquis to be continued. , pain management, minimal since of sob.     Possible gram negative bacterial pneumonia//Suspect early HCAP in setting of PE with necrosis         Hemoptysis, w/u and f/u planned ( see above) hgb stable,     Code status: full   DVT prophylaxis: on [address] discussed with: Patient/Family and Consultant Dr. Rani Manning   Disposition: Home w/Family and TBD     Subjective:     ROS      Review of Systems:  + fever, _chills, +mild cough,+ cp with deep breaths and cough but better, no n/v/d/, pain on swallowing better, cont with scant hemoptysis       Could NOT obtain due to:      Objective:       PHYSICAL EXAM:  Vital signs below          Constitutional:   No acute distress  ,     Alert;           Conversant;      Eyes: anicteric sclerae, moist conjunctivae;    no  lid-lag;    + PERRLA;    Ears, nose,mouth,and throat:   Normal external ears ; oropharynx clear with  moist  mucous membranes; no posterior oral lesions; and  trach is  mid line; Respiratory:  Less  Diminished and essentially clear but else Trachea midline;     with normal  respiratory effort; and no   intercostal retractions;    Cardiovascular: RRR, no MRGs;    Gastrointestinal: Soft, non-tender ; no  masses or HSM; no  guarding; no  rebound;        Musculoskelatal:  extreme ties are supple;  no  peripheral edema;  neck with  FROM; atraumatic    Skin:  Temperature warm moises back ;  turgor is nl  ; the texture nl  ;  no  rash;     Neurologic: CN 2- 12  intact;  no  focal motor weakness;   ;    Psychiatric:  Appropriate  affect; alert  ; and  oriented to person, place , and time;    Heme/Lymph/Immune:  Cervical adenopathy not  present; axillary adenopathy not  present;       VITALS:   Last 24hrs VS reviewed since prior progress note.  Most recent are:  Patient Vitals for the past 24 hrs:   Temp Pulse Resp BP SpO2   07/03/17 1230 99.4 °F (37.4 °C) 86 18 147/87 99 %   07/03/17 0941 99.9 °F (37.7 °C) 85 18 148/87 99 %   07/02/17 2354 98.3 °F (36.8 °C) 70 18 130/81 99 %   07/02/17 2217 98 °F (36.7 °C) - - - -   07/02/17 2022 (!) 100.5 °F (38.1 °C) 87 20 141/87 100 %   07/02/17 1528 98.9 °F (37.2 °C) 76 20 143/77 99 %       Intake/Output Summary (Last 24 hours) at 07/03/17 1251  Last data filed at 07/03/17 0941   Gross per 24 hour   Intake          1985.83 ml   Output             2025 ml   Net           -39.17 ml       _____________________________  Medicines:    Current Facility-Administered Medications:     piperacillin-tazobactam (ZOSYN) 3.375 g in 0.9% sodium chloride (MBP/ADV) 100 mL, 3.375 g, IntraVENous, Q8H, Alize Blackmon MD    0.9% sodium chloride infusion, 125 mL/hr, IntraVENous, CONTINUOUS, Alize Blackmon MD, Last Rate: 125 mL/hr at 07/03/17 0459, 125 mL/hr at 07/03/17 0459    HYDROmorphone (PF) (DILAUDID) injection 2 mg, 2 mg, IntraVENous, Q4H PRN, Alize Blackmon MD, 2 mg at 07/03/17 2691   oxyCODONE-acetaminophen (PERCOCET) 5-325 mg per tablet 1-2 Tab, 1-2 Tab, Oral, Q4H PRN, eCsilia Niño MD, 2 Tab at 07/03/17 1217    sodium chloride (NS) flush 5-10 mL, 5-10 mL, IntraVENous, Q8H, Rae Carrero MD, 10 mL at 07/02/17 2028    sodium chloride (NS) flush 5-10 mL, 5-10 mL, IntraVENous, PRN, Demetrius Clifton MD, 10 mL at 07/02/17 1629    acetaminophen (TYLENOL) tablet 650 mg, 650 mg, Oral, Q4H PRN, Rae Carrero MD, 650 mg at 07/02/17 2037    naloxone (NARCAN) injection 0.4 mg, 0.4 mg, IntraVENous, PRN, Rae Carrero MD    ondansetron [facility]) injection 4 mg, 4 mg, IntraVENous, Q4H PRN, Rae Carrero MD    docusate sodium (COLACE) capsule 100 mg, 100 mg, Oral, BID, Demetrius Clifton MD, 100 mg at 07/03/17 2998    zolpidem (AMBIEN) tablet 5 mg, 5 mg, Oral, QHS PRN, Rae Carrero MD    apixaban (ELIQUIS) tablet 10 mg, 10 mg, Oral, BID, Demetrius Clifton MD, 10 mg at 07/03/17 6708    lidocaine (LIDODERM) 5 % patch 2 Patch, 2 Patch, TransDERmal, Q24H, Lacy Plata NP, 2 Patch at 07/02/17 2252  Procedures: see electronic medical records for all procedures/Xrays and details which were not copied into this note but were reviewed prior to creation of Plan. LABS:  Recent Labs      07/03/17   1024  07/02/17   1151   WBC  9.8  9.4   HGB  12.5  11.9*   HCT  39.0  37.3   PLT  246  213     Recent Labs      07/02/17   1151  07/01/17   0325   NA  135*  137   K  3.7  3.7   CL  101  104   CO2  29  27   BUN  5*  5*   CREA  0.87  1.00   GLU  92  99   CA  9.0  8.6     Recent Labs      07/02/17   1151   SGOT  8*   ALT  22   AP  70   TBILI  0.8   TP  8.1   ALB  3.1*   GLOB  5.0*     No results for input(s): INR, PTP, APTT in the last 72 hours. No lab exists for component: INREXT, INREXT   No results for input(s): FE, TIBC, PSAT, FERR in the last 72 hours.    No results found for: FOL, RBCF   No results for input(s): PH, PCO2, PO2 in the last 72 hours. Recent Labs      07/02/17   1154   PHI  7.385   PO2I  98   PCO2I  47.6*     No results for input(s): CPK, CKNDX, TROIQ in the last 72 hours. No lab exists for component: CPKMB  No results found for: CHOL, CHOLX, CHLST, CHOLV, HDL, LDL, LDLC, DLDLP, TGLX, TRIGL, TRIGP, CHHD, CHHDX  No results found for: [facility]  Lab Results   Component Value Date/Time    Color YELLOW/STRAW 08/07/2013 05:35 PM    Appearance CLEAR 08/07/2013 05:35 PM    Specific gravity 1.009 08/07/2013 05:35 PM    pH (UA) 7.5 08/07/2013 05:35 PM    Protein NEGATIVE  08/07/2013 05:35 PM    Glucose NEGATIVE  08/07/2013 05:35 PM    Ketone NEGATIVE  08/07/2013 05:35 PM    Bilirubin NEGATIVE  08/07/2013 05:35 PM    Urobilinogen 0.2 08/07/2013 05:35 PM    Nitrites NEGATIVE  08/07/2013 05:35 PM    Leukocyte Esterase NEGATIVE  08/07/2013 05:35 PM    Epithelial cells 0-5 08/07/2013 05:35 PM    Bacteria NEGATIVE  08/07/2013 05:35 PM    WBC 0-4 08/07/2013 05:35 PM    RBC 0-5 08/07/2013 05:35 PM           CULTURES:    No results found for: SDES No results found for: CULT     CT Results (most recent):    Results from [place] encounter on 06/30/17   CTA CHEST W OR W WO CONT   Narrative EXAM:  CTA CHEST W OR W WO CONT    INDICATION:  Right-sided pleuritic pain for 2 days and elevated d dimer    COMPARISON: None. CONTRAST:  85 mL of Isovue-370. TECHNIQUE:   Precontrast  images were obtained to localize the volume for acquisition. Multislice helical CT arteriography was performed from the diaphragm to the  thoracic inlet during uneventful rapid bolus intravenous contrast  administration. Lung and soft tissue windows were generated. Coronal and  sagittal images were generated and 3D post processing consisting of coronal  maximum intensity images was performed.   CT dose reduction was achieved through  use of a standardized protocol tailored for this examination and automatic  exposure control for dose modulation. FINDINGS:  The lungs are clear of mass, nodule, airspace disease or edema. There are emboli extending into the right lower lobe with associated moderate  peripheral airspace disease. A few peripheral emboli are noted in the inferior  peripheral branches of the left lower lobe with associated minimal peripheral  airspace disease. There is no mediastinal or hilar adenopathy or mass. The aorta  enhances normally without evidence of aneurysm or dissection. The visualized portions of the upper abdominal organs are normal.         Impression IMPRESSION:     Positive bilateral lower lobe pulmonary emboli with presumed peripheral  infarctions as described. [place] more significant on the right. This result was verbally relayed to Romana Puller at 1349 hours by myself.   789                (Use of word \"will\" = I will, or I did, or done by me, or to be done by me)  Esau Guadalupe MD

## 2017-07-03 NOTE — PROGRESS NOTES
Chart reviewed. Pt has no significant past medical history. Pt admitted for pulmonary embolism. CM met with pt to introduce role of CM and discuss discharge needs. Pt lives alone in a private residence in [place]. Pt is independent with ADLs and used no DME PTA. Pt is currently employed as a . Emergency contact is mother Lisa Dance ([phone]). Pt was given a list of [facility] PCPs by previous CM. CM asked pt if he had chosen a PCP yet. Pt stated his mother has the list. CM will revisit pt prior to discharge to option PCP choice and schedule appointment. Pt prefers afternoon appointments. Family will transport pt home via car at discharge. CM will follow for discharge planning. Care Management Interventions  PCP Verified by CM: No  Mode of Transport at Discharge:  Other (see comment) (family)  Transition of Care Consult (CM Consult): Discharge Planning  MyChart Signup: No  Discharge Durable Medical Equipment: No  Physical Therapy Consult: No  Occupational Therapy Consult: No  Speech Therapy Consult: No  Current Support Network: Own Home, Lives Alone  Confirm Follow Up Transport: Family  Plan discussed with Pt/Family/Caregiver: Yes  Discharge Location  Discharge Placement: Home     Kandi Mitchell, BSW/CRM

## 2017-07-03 NOTE — ROUTINE PROCESS
Bedside shift change report given to mike lake rn (oncoming nurse) by Jose Vasquez (offgoing nurse). Report included the following information SBAR and Kardex.

## 2017-07-03 NOTE — PROGRESS NOTES
Bedside shift [facility] ([facility]) by Yuriy Fitzpatrick (offgoing nurse). Report included the following information SBAR and Kardex.

## 2017-07-04 PROBLEM — J15.6 PNEUMONIA DUE TO GRAM-NEGATIVE BACTERIA (HCC): Status: ACTIVE | Noted: 2017-07-04

## 2017-07-04 PROCEDURE — 74011000258 HC RX REV CODE- 258: Performed by: INTERNAL MEDICINE

## 2017-07-04 PROCEDURE — 77010033678 HC OXYGEN DAILY

## 2017-07-04 PROCEDURE — 74011250636 HC RX REV CODE- 250/636: Performed by: INTERNAL MEDICINE

## 2017-07-04 PROCEDURE — 74011250637 HC RX REV CODE- 250/637: Performed by: INTERNAL MEDICINE

## 2017-07-04 PROCEDURE — 74011250637 HC RX REV CODE- 250/637: Performed by: HOSPITALIST

## 2017-07-04 PROCEDURE — 65270000032 HC RM SEMIPRIVATE

## 2017-07-04 RX ORDER — AMOXICILLIN AND CLAVULANATE POTASSIUM 875; 125 MG/1; MG/1
1 TABLET, FILM COATED ORAL EVERY 12 HOURS
Status: DISCONTINUED | OUTPATIENT
Start: 2017-07-04 | End: 2017-07-05 | Stop reason: HOSPADM

## 2017-07-04 RX ORDER — HYDROMORPHONE HYDROCHLORIDE 2 MG/1
2 TABLET ORAL
Qty: 15 TAB | Refills: 0 | Status: SHIPPED | OUTPATIENT
Start: 2017-07-04 | End: 2017-07-05

## 2017-07-04 RX ORDER — AMOXICILLIN AND CLAVULANATE POTASSIUM 875; 125 MG/1; MG/1
1 TABLET, FILM COATED ORAL EVERY 12 HOURS
Qty: 12 TAB | Refills: 0 | Status: SHIPPED | OUTPATIENT
Start: 2017-07-04 | End: 2017-07-10

## 2017-07-04 RX ORDER — HYDROMORPHONE HYDROCHLORIDE 2 MG/1
2 TABLET ORAL
Status: DISCONTINUED | OUTPATIENT
Start: 2017-07-04 | End: 2017-07-05

## 2017-07-04 RX ADMIN — HYDROMORPHONE HYDROCHLORIDE 2 MG: 2 TABLET ORAL at 21:59

## 2017-07-04 RX ADMIN — HYDROMORPHONE HYDROCHLORIDE 2 MG: 2 TABLET ORAL at 09:58

## 2017-07-04 RX ADMIN — Medication 10 ML: at 04:56

## 2017-07-04 RX ADMIN — HYDROMORPHONE HYDROCHLORIDE 2 MG: 1 INJECTION, SOLUTION INTRAMUSCULAR; INTRAVENOUS; SUBCUTANEOUS at 00:48

## 2017-07-04 RX ADMIN — APIXABAN 10 MG: 5 TABLET, FILM COATED ORAL at 09:58

## 2017-07-04 RX ADMIN — OXYCODONE HYDROCHLORIDE AND ACETAMINOPHEN 2 TABLET: 5; 325 TABLET ORAL at 02:44

## 2017-07-04 RX ADMIN — DOCUSATE SODIUM 100 MG: 100 CAPSULE, LIQUID FILLED ORAL at 18:01

## 2017-07-04 RX ADMIN — PIPERACILLIN SODIUM,TAZOBACTAM SODIUM 3.38 G: 3; .375 INJECTION, POWDER, FOR SOLUTION INTRAVENOUS at 05:00

## 2017-07-04 RX ADMIN — HYDROMORPHONE HYDROCHLORIDE 2 MG: 2 TABLET ORAL at 14:01

## 2017-07-04 RX ADMIN — APIXABAN 10 MG: 5 TABLET, FILM COATED ORAL at 21:59

## 2017-07-04 RX ADMIN — AMOXICILLIN AND CLAVULANATE POTASSIUM 1 TABLET: 875; 125 TABLET, FILM COATED ORAL at 09:58

## 2017-07-04 RX ADMIN — HYDROMORPHONE HYDROCHLORIDE 2 MG: 1 INJECTION, SOLUTION INTRAMUSCULAR; INTRAVENOUS; SUBCUTANEOUS at 04:54

## 2017-07-04 RX ADMIN — HYDROMORPHONE HYDROCHLORIDE 2 MG: 2 TABLET ORAL at 18:00

## 2017-07-04 RX ADMIN — SODIUM CHLORIDE 125 ML/HR: 900 INJECTION, SOLUTION INTRAVENOUS at 04:52

## 2017-07-04 RX ADMIN — AMOXICILLIN AND CLAVULANATE POTASSIUM 1 TABLET: 875; 125 TABLET, FILM COATED ORAL at 21:59

## 2017-07-04 RX ADMIN — DOCUSATE SODIUM 100 MG: 100 CAPSULE, LIQUID FILLED ORAL at 09:58

## 2017-07-04 NOTE — PROGRESS NOTES
Chart reviewed. CM met with pt to obtain PCP choice. Pt stated that he is still deciding on a PCP. CM informed pt that MD would like pt to have appointment scheduled with PCP prior to discharge. Pt stated that he will choose a PCP before he discharges. CM will follow-up with pt when it gets closer to discharge to obtain PCP choice and schedule appointment. CM informed MD that if pt discharges today, CM unable to schedule an appointment with PCP as offices are closed for the holiday.     Boy Helton, IRVING/TIFFANY

## 2017-07-04 NOTE — PROGRESS NOTES
Hospitalist Progress Note  Maury Haro NP  Office: [phone]  Cell: [phone]      Date of Service:  2017  NAME:  Thad Cash Sr.  :  1985  MRN:  [id number]      Admission Summary:   32yom with no pmh who presents with 2-3 days of shortness of breath and right shoulder pain. Pt states   that a couple days ago he went to a friend's apartment and was walking upstairs when he noticed that he was increasingly short of breath which is unusual for him. When he sat down he noticed sharp pains in his back radiating up to his shoulder. He tried to tough out the pain though it kept recurring over the next few days. This weekend he drove down from [place] where he lived to [place], to visit his mother. He noticed that he started developing pleuritic pain with deep breathing in his back and shoulder. When he arrived here he noticed that he felt increasingly dizzy and like he was going to pass out, though he never lost consciousness. Pt denies any personal or family h/o blood clots, early MI or CVA. He has no recent long distance travel besides his drive down from [place] that occurred after his sxs began. Denies any personal history of cancer. Interval history / Subjective:     Patient continues to report chest pain with R>L rating pain as a 7 out of 10. He reports that the oral dilaudid is helping allowing him to get a nap in this morning. Patient notes his shortness of breath is improved and hs is able to take deeper breaths in and get the IS up to 1000. He continues to report coughing up blood but says this is also better. Patient requesting note to provide to [facility] stating that he needs to be medically discharged as he believes he is at high risk of bleeding with having to be on OACs. Informed patient that he will need to follow up with a pcp or hematologist to have paper work completed.       Assessment & Plan: Bilateral Pulmonary Emboli  -CTA chest shows Positive bilateral lower lobe pulmonary emboli with presumed peripheral  infarctions as described. [place] more significant on the right.  -seen by Dr Rani Manning with pulmonary who recommends oral anticoagulant and for the patient to follow up with hematology at [facility] for complete coag w/u as pt is from [address]   -continue Eliquis  -echo EF 50-55% no evidence of Rt heart strain  -changed IV pain meds to po    Hemoptysis   -hgb stable  -plan as above   -monitor    Possible Gram Negative Pneumonia vs Atelectasis   -in the setting of bilateral PE likely related to poor inspiratory effort  -change IV Zosyn to po Augmentin   -encourage IS and ambulation    Code status: Full  DVT prophylaxis: Eliquis    Care Plan discussed with: Patient/Family and Nurse Dr Cedeño Sites  Disposition: Home w/Family plan for discharge tomorrow     Hospital Problems  Date Reviewed: 2/21/2013          Codes Class Noted POA    * (Principal)Pulmonary emboli [facility]) ICD-10-CM: I26.99  ICD-9-CM: 415.19  6/30/2017 Unknown        PE (pulmonary thromboembolism) ([facility] [address] ICD-10-CM: I26.99  ICD-9-CM: 415.19  6/30/2017 Unknown                Review of Systems:   A comprehensive review of systems was negative except for: Respiratory: positive for hemoptysis or dyspnea on exertion  Musculoskeletal: positive for chest and back pain       Vital Signs:    Last 24hrs VS reviewed since prior progress note.  Most recent are:  Visit Vitals    BP (!) 150/93    Pulse 69    Temp 98.2 °F (36.8 °C)    Resp 16    Ht 6' 1\" (1.854 m)    Wt 90.7 kg (200 lb)    SpO2 100%    BMI 26.39 kg/m2         Intake/Output Summary (Last 24 hours) at 07/04/17 1407  Last data filed at 07/04/17 0450   Gross per 24 hour   Intake                0 ml   Output             2350 ml   Net            -2350 ml        Physical Examination:             Constitutional:  No acute distress, cooperative, pleasant    ENT:  Oral mucous moist, oropharynx benign. Neck supple,    Resp:  CTA bilaterally. No wheezing/rhonchi/rales. No accessory muscle use   CV:  Regular rhythm, normal rate, no murmurs, gallops, rubs    GI:  Soft, non distended, non tender. normoactive bowel sounds, no hepatosplenomegaly     Musculoskeletal:  No edema, warm, 2+ pulses throughout    Neurologic:  Moves all extremities. AAOx3     Psych:  Good insight, Not anxious nor agitated. Data Review:    Review and/or order of clinical lab test  Review and/or order of tests in the radiology section of CPT  Review and/or order of tests in the medicine section of [facility]      Labs:     Recent Labs      07/03/17   1024  07/02/17   1151   WBC  9.8  9.4   HGB  12.5  11.9*   HCT  39.0  37.3   PLT  246  213     Recent Labs      07/02/17   1151   NA  135*   K  3.7   CL  101   CO2  29   BUN  5*   CREA  0.87   GLU  92   CA  9.0     Recent Labs      07/02/17   1151   SGOT  8*   ALT  22   AP  70   TBILI  0.8   TP  8.1   ALB  3.1*   GLOB  5.0*     No results for input(s): INR, PTP, APTT in the last 72 hours. No lab exists for component: INREXT   No results for input(s): FE, TIBC, PSAT, FERR in the last 72 hours. No results found for: FOL, RBCF   No results for input(s): PH, PCO2, PO2 in the last 72 hours. No results for input(s): CPK, CKNDX, TROIQ in the last 72 hours.     No lab exists for component: CPKMB  No results found for: CHOL, CHOLX, CHLST, CHOLV, HDL, LDL, LDLC, DLDLP, TGLX, TRIGL, TRIGP, CHHD, CHHDX  No results found for: [facility]  Lab Results   Component Value Date/Time    Color YELLOW/STRAW 08/07/2013 05:35 PM    Appearance CLEAR 08/07/2013 05:35 PM    Specific gravity 1.009 08/07/2013 05:35 PM    pH (UA) 7.5 08/07/2013 05:35 PM    Protein NEGATIVE  08/07/2013 05:35 PM    Glucose NEGATIVE  08/07/2013 05:35 PM    Ketone NEGATIVE  08/07/2013 05:35 PM    Bilirubin NEGATIVE  08/07/2013 05:35 PM    Urobilinogen 0.2 08/07/2013 05:35 PM    Nitrites NEGATIVE  08/07/2013 05:35 PM    Leukocyte Esterase NEGATIVE  08/07/2013 05:35 PM    Epithelial cells 0-5 08/07/2013 05:35 PM    Bacteria NEGATIVE  08/07/2013 05:35 PM    WBC 0-4 08/07/2013 05:35 PM    RBC 0-5 08/07/2013 05:35 PM         Medications Reviewed:     Current Facility-Administered Medications   Medication Dose Route Frequency    HYDROmorphone (DILAUDID) tablet 2 mg  2 mg Oral Q4H PRN    amoxicillin-clavulanate (AUGMENTIN) 875-125 mg per tablet 1 Tab  1 Tab Oral Q12H    sodium chloride (NS) flush 5-10 mL  5-10 mL IntraVENous Q8H    sodium chloride (NS) flush 5-10 mL  5-10 mL IntraVENous PRN    acetaminophen (TYLENOL) tablet 650 mg  650 mg Oral Q4H PRN    naloxone (NARCAN) injection 0.4 mg  0.4 mg IntraVENous PRN    ondansetron (ZOFRAN) injection 4 mg  4 mg IntraVENous Q4H PRN    docusate sodium (COLACE) capsule 100 mg  100 mg Oral BID    zolpidem (AMBIEN) tablet 5 mg  5 mg Oral QHS PRN    apixaban (ELIQUIS) tablet 10 mg  10 mg Oral BID    lidocaine (LIDODERM) 5 % patch 2 Patch  2 Patch TransDERmal Q24H     ______________________________________________________________________  EXPECTED LENGTH OF STAY: 4d 16h  ACTUAL LENGTH OF STAY:          [address] [facility]

## 2017-07-04 NOTE — DISCHARGE SUMMARY
Discharge Summary       PATIENT ID: Santi Melendrez Sr. MRN: [id number]   YOB: 1985    DATE OF ADMISSION: 6/30/2017 10:57 AM    DATE OF DISCHARGE: 7/5/2017   PRIMARY CARE PROVIDER: Vladimir Onofre MD     ATTENDING PHYSICIAN: Aubrey Sanabria  DISCHARGING PROVIDER: Anneliese Monique NP    To contact this individual call [phone] and ask the  to page. If unavailable ask to be transferred the Adult Hospitalist Department. CONSULTATIONS: IP CONSULT TO HEMATOLOGY    PROCEDURES/SURGERIES: * No surgery found *    ADMITTING DIAGNOSES & HOSPITAL COURSE:   Dx: Jose PE, Pneumonia    32yom with no pmh who presents with 2-3 days of shortness of breath and right shoulder pain. Pt states   that a couple days ago he went to a friend's apartment and was walking upstairs when he noticed that he was increasingly short of breath which is unusual for him. When he sat down he noticed sharp pains in his back radiating up to his shoulder. He tried to tough out the pain though it kept recurring over the next few days. This weekend he drove down from [place] where he lived to [facility], to visit his mother. He noticed that he started developing pleuritic pain with deep breathing in his back and shoulder. When he arrived here he noticed that he felt increasingly dizzy and like he was going to pass out, though he never lost consciousness. Pt denies any personal or family h/o blood clots, early MI or CVA. He has no recent long distance travel besides his drive down from [place] that occurred after his sxs began. Denies any personal history of cancer. CTA chest shows Positive bilateral lower lobe pulmonary emboli with presumed peripheral infarctions as described. [place] more significant on the right. Patient started on Eliquis. Echo negative for right heart strain. Patient sating 96% on room air. Patient stable for discharge home.     DISCHARGE DIAGNOSES / PLAN:      Bilateral Pulmonary Emboli  -CTA chest shows Positive bilateral lower lobe pulmonary emboli with presumed peripheral  infarctions as described. [place] more significant on the right.  -seen by Dr Araceli Dallas with pulmonary who recommends oral anticoagulant and for the patient to follow up with hematology at [facility] for complete coag w/u as pt is from [address]   -continue Eliquis  -echo EF 50-55% no evidence of Rt heart strain  -changed IV pain meds to po     Hemoptysis   -hgb stable  -plan as above   -monitor     Possible Gram Negative Pneumonia vs Atelectasis   -in the setting of bilateral PE likely related to poor inspiratory effort  -change IV Zosyn to po Augmentin   -encourage IS and ambulation          PENDING TEST RESULTS:   At the time of discharge the following test results are still pending: none    FOLLOW UP APPOINTMENTS:    Follow-up Information     Follow up With Details Comments Contact Info    Alexandre Wells MD Go on 7/20/2017 For new patient appointment on 7/20/17 at 11:45 AM. Please arrive 30 minutes early and bring your insurance card, ID, and list of medications. [place] [id number]  [phone]             ADDITIONAL CARE RECOMMENDATIONS:   1. We have prescribed you the following new medications:  -Eliquis, this is the blood thinner for the blood clots in your lungs. You will need to take 10mg (2 tabs) twice daily for 2 days then you can decrease to 5mg (1 tablet) twice daily until further directed. -Augmentin this is antibiotic for pneumonia. Make sure to take with food as it can cause stomach upset. -Percocet you can take this as needed for pain. Please not this is a narcotic that can make you drowsy. Do not drive after taking the medication. See below for more information on the medications to include common side effects. 2. Make sure to use the incentive spirometer 10 times a hour while awake.    3. Recommending you following up hematology at [facility] for further clotting disorder work up. DIET: Regular    ACTIVITY: as tolerated    WOUND CARE: none    EQUIPMENT needed: none      DISCHARGE MEDICATIONS:  Current Discharge Medication List      START taking these medications    Details   oxyCODONE-acetaminophen (PERCOCET 10)  mg per tablet Take 1 Tab by mouth every four (4) hours as needed. Max Daily Amount: 6 Tabs. Qty: 20 Tab, Refills: 0      amoxicillin-clavulanate (AUGMENTIN) 875-125 mg per tablet Take 1 Tab by mouth every twelve (12) hours for 6 days. Qty: 12 Tab, Refills: 0      !! apixaban (ELIQUIS) 5 mg tablet Take 2 Tabs by mouth two (2) times a day for 2 days. Qty: 8 Tab, Refills: 0      !! apixaban (ELIQUIS) 5 mg tablet Take 1 Tab by mouth two (2) times a day. Qty: 60 Tab, Refills: 1       !! - Potential duplicate medications found. Please discuss with provider. NOTIFY YOUR PHYSICIAN FOR ANY OF THE FOLLOWING:   Fever over 101 degrees for 24 hours. Chest pain, shortness of breath, fever, chills, nausea, vomiting, diarrhea, change in mentation, falling, weakness, bleeding. Severe pain or pain not relieved by medications. Or, any other signs or symptoms that you may have questions about. DISPOSITION:  X  Home With:   OT  PT  HH  RN       Long term SNF/Inpatient Rehab    Independent/assisted living    Hospice    Other:       PATIENT CONDITION AT DISCHARGE:     Functional status    Poor     Deconditioned    X Independent      Cognition    X Lucid     Forgetful     Dementia      Catheters/lines (plus indication)    Adhikari     PICC     PEG    X None      Code status   X  Full code     DNR      PHYSICAL EXAMINATION AT DISCHARGE:  General: No acute distress, cooperative, pleasant   EENT: EOMI. Anicteric sclerae. Oral mucous moist, oropharynx benign  Resp: CTA bilaterally. No wheezing/rhonchi/rales. No accessory muscle use  CV: Regular rhythm, normal rate, no murmurs, gallops, rubs  GI: Soft, non distended, non tender.  normoactive bowel sounds, Extremities: No edema, warm, 2+ pulses throughout  Neurologic: Moves all extremities. AAOx3, CN II-XII grossly intact  Psych: Good insight. Not anxious nor agitated.   Skin: Good Turgor, no rashes or ulcers      CHRONIC MEDICAL DIAGNOSES:  Problem List as of 7/5/2017  Date Reviewed: 7/4/2017          Codes Class Noted - Resolved    Pneumonia due to Gram-negative bacteria [facility]) ICD-10-CM: J15.6  ICD-9-CM: 482.83  7/4/2017 - Present        * (Principal)Acute pulmonary embolism ([facility] [address] ICD-10-CM: I26.99  ICD-9-CM: 415.19  6/30/2017 - Present              Greater than 40 minutes were spent with the patient on counseling and coordination of care    Signed:   Milka Melton NP  7/5/2017  10:29 am

## 2017-07-04 NOTE — PROGRESS NOTES
PT complaining of worsening chest pain post walking. Called Dr. Samir Griffin and he wants to continue monitoring. Nurse getting vitals and continuing to monitor.

## 2017-07-04 NOTE — PROGRESS NOTES
Hospitalist Progress Note            Daily Progress Note: 7/4/2017    I have seen and examined the patient. Case discussed with NP, please see her more detailed note   I agree with the overall treatment plan as documented. Unprovoked PE--now on Eliquis after discussion with Dr Evelyn Flores. Understands the risks vs benefits specially with his job  Pain control--still on IV pain meds, will switch to oral today  ? Pneumonia vs atelectasis probably from poor inspiratory effort--  -Continues to have poor inspiratory effort  -On Zosyn, no cultures, will consider switching to oral, continue incentive spirometry  On IV fluids--not sure why, will stop  Wants to get a letter for his job  Discontinue tele  Probable discharge today or tomorrow, patient agrees        Maralyn Prader, MD      Hospitalist, Internal Medicine            [facility] number:  [phone]

## 2017-07-04 NOTE — PROGRESS NOTES
Bedside shift change report given to [facility], RN (oncoming nurse) by Carlito Lizama RN (offgoing nurse). Report included the following information SBAR, Kardex, Intake/Output and MAR.

## 2017-07-04 NOTE — PROGRESS NOTES
Bedside shift change report given to Estrellita Jimenes (oncoming nurse) by Lyndsey Casiano RN (offgoing nurse). Report included the following information SBAR, Kardex, Intake/Output, MAR, Recent Results and Cardiac Rhythm NSR.

## 2017-07-05 VITALS
RESPIRATION RATE: 18 BRPM | TEMPERATURE: 97.6 F | BODY MASS INDEX: 26.51 KG/M2 | DIASTOLIC BLOOD PRESSURE: 86 MMHG | SYSTOLIC BLOOD PRESSURE: 127 MMHG | WEIGHT: 200 LBS | HEIGHT: 73 IN | HEART RATE: 65 BPM | OXYGEN SATURATION: 96 %

## 2017-07-05 DIAGNOSIS — I26.99 OTHER ACUTE PULMONARY EMBOLISM: Primary | ICD-10-CM

## 2017-07-05 LAB
LA PPP-IMP: ABNORMAL
SCREEN APTT: 32.7 SEC (ref 0–51.9)
SCREEN DRVVT: 49.9 SEC (ref 0–47)

## 2017-07-05 PROCEDURE — 74011250637 HC RX REV CODE- 250/637: Performed by: HOSPITALIST

## 2017-07-05 PROCEDURE — 74011250637 HC RX REV CODE- 250/637: Performed by: NURSE PRACTITIONER

## 2017-07-05 RX ORDER — OXYCODONE AND ACETAMINOPHEN 10; 325 MG/1; MG/1
1 TABLET ORAL
Qty: 20 TAB | Refills: 0 | Status: SHIPPED | OUTPATIENT
Start: 2017-07-05 | End: 2017-07-12 | Stop reason: DRUGHIGH

## 2017-07-05 RX ORDER — OXYCODONE AND ACETAMINOPHEN 10; 325 MG/1; MG/1
1 TABLET ORAL
Status: DISCONTINUED | OUTPATIENT
Start: 2017-07-05 | End: 2017-07-05 | Stop reason: HOSPADM

## 2017-07-05 RX ADMIN — OXYCODONE HYDROCHLORIDE AND ACETAMINOPHEN 1 TABLET: 10; 325 TABLET ORAL at 10:11

## 2017-07-05 RX ADMIN — OXYCODONE HYDROCHLORIDE AND ACETAMINOPHEN 1 TABLET: 10; 325 TABLET ORAL at 13:12

## 2017-07-05 RX ADMIN — AMOXICILLIN AND CLAVULANATE POTASSIUM 1 TABLET: 875; 125 TABLET, FILM COATED ORAL at 08:45

## 2017-07-05 RX ADMIN — APIXABAN 10 MG: 5 TABLET, FILM COATED ORAL at 08:45

## 2017-07-05 RX ADMIN — DOCUSATE SODIUM 100 MG: 100 CAPSULE, LIQUID FILLED ORAL at 08:45

## 2017-07-05 RX ADMIN — HYDROMORPHONE HYDROCHLORIDE 2 MG: 2 TABLET ORAL at 04:45

## 2017-07-05 NOTE — PROGRESS NOTES
Hospitalist Progress Note            Daily Progress Note: 7/5/2017    I have seen and examined the patient. Case discussed with NP, please see her more detailed note   I agree with the overall treatment plan as documented. Unprovoked PE--now on Eliquis after discussion with Dr Jai Gutierrez. Understands the risks vs benefits specially with his job  Pain control--still on IV pain meds, will switch to oral today  ? Pneumonia vs atelectasis probably from poor inspiratory effort--  -Continues to have poor inspiratory effort although better  -Was on Zosyn, switched to Augmentin  Wants to get a letter for his job  Discontinue tele  Probable discharge today         Severiano Dole, MD      Hospitalist, Internal Medicine            [facility] number:  [phone]

## 2017-07-05 NOTE — PROGRESS NOTES
[address] July 5, 2017       RE: Merline Lake Sr. To Whom It May Concern,    This is to certify that Manuel Burnett was hospitalized from 6/30/2017 to 7/5/2017 for bilateral lung blood clots. He is currently on and will need to remain on blood thinners until directed by a hematologist for his blood clots. Please feel free to contact my office if you have any questions or concerns. Thank you for your assistance in this matter.       Sincerely,      Arlin Mari NP  [phone]

## 2017-07-05 NOTE — PROGRESS NOTES
CM met with pt to obtain PCP choice. Pt chose Dr. Franne Prim[facility] at [facility] ([phone]). CM called practice to schedule new patient appointment. CM left message for return call. CM received call from [employer].  CM scheduled soonest new patient appointment with Dr. Valentina Denis on July 20th at [address] [facility], BSW/CRM

## 2017-07-05 NOTE — DISCHARGE INSTRUCTIONS
DISCHARGE SUMMARY from Nurse    The following personal items are in your possession at time of discharge:    Dental Appliances: None  Visual Aid: None     Home Medications: None  Jewelry: Necklace  Clothing: With patient  Other Valuables: Cell Phone, Other (comment) (rock)             PATIENT INSTRUCTIONS:    After general anesthesia or intravenous sedation, for 24 hours or while taking prescription Narcotics:  · Limit your activities  · Do not drive and operate hazardous machinery  · Do not make important personal or business decisions  · Do  not drink alcoholic beverages  · If you have not urinated within 8 hours after discharge, please contact your surgeon on call. Report the following to your surgeon:  · Excessive pain, swelling, redness or odor of or around the surgical area  · Temperature over 100.5  · Nausea and vomiting lasting longer than 4 hours or if unable to take medications  · Any signs of decreased circulation or nerve impairment to extremity: change in color, persistent  numbness, tingling, coldness or increase pain  · Any questions        What to do at Home:    *  Please give a list of your current medications to your Primary Care Provider. *  Please update this list whenever your medications are discontinued, doses are      changed, or new medications (including over-the-counter products) are added. *  Please carry medication information at all times in case of emergency situations. These are general instructions for a healthy lifestyle:    No smoking/ No tobacco products/ Avoid exposure to second hand smoke    Surgeon General's Warning:  Quitting smoking now greatly reduces serious risk to your health.     Obesity, smoking, and sedentary lifestyle greatly increases your risk for illness    A healthy diet, regular physical exercise & weight monitoring are important for maintaining a healthy lifestyle    You may be retaining fluid if you have a history of heart failure or if you experience any of the following symptoms:  Weight gain of 3 pounds or more overnight or 5 pounds in a week, increased swelling in our hands or feet or shortness of breath while lying flat in bed. Please call your doctor as soon as you notice any of these symptoms; do not wait until your next office visit. Recognize signs and symptoms of STROKE:    F-face looks uneven    A-arms unable to move or move unevenly    S-speech slurred or non-existent    T-time-call 911 as soon as signs and symptoms begin-DO NOT go       Back to bed or wait to see if you get better-TIME IS BRAIN. Warning Signs of HEART ATTACK     Call 911 if you have these symptoms:   Chest discomfort. Most heart attacks involve discomfort in the center of the chest that lasts more than a few minutes, or that goes away and comes back. It can feel like uncomfortable pressure, squeezing, fullness, or pain.  Discomfort in other areas of the upper body. Symptoms can include pain or discomfort in one or both arms, the back, neck, jaw, or stomach.  Shortness of breath with or without chest discomfort.  Other signs may include breaking out in a cold sweat, nausea, or lightheadedness. Don't wait more than five minutes to call 911 - MINUTES MATTER! Fast action can save your life. Calling 911 is almost always the fastest way to get lifesaving treatment. Emergency Medical Services staff can begin treatment when they arrive -- up to an hour sooner than if someone gets to the hospital by car. The discharge information has been reviewed with the patient. The patient verbalized understanding. Discharge medications reviewed with the patient and appropriate educational materials and side effects teaching were provided. [employer] Activation    Thank you for requesting access to [employer]. Please follow the instructions below to securely access and download your online medical record.  [employer] allows you to send messages to your doctor, view your test results, renew your prescriptions, schedule appointments, and more. How Do I Sign Up? 1. In your internet browser, go to [email or web address]  2. Click on the First Time User? Click Here link in the Sign In box. You will be redirect to the New Member Sign Up page. 3. Enter your AddressReport Access Code exactly as it appears below. You will not need to use this code after youve completed the sign-up process. If you do not sign up before the expiration date, you must request a new code. AddressReport Access Code: [email or web address]  Expires: 2017  4:54 PM (This is the date your AddressReport access code will )    4. Enter the last four digits of your Social Security Number (xxxx) and Date of Birth (mm/dd/yyyy) as indicated and click Submit. You will be taken to the next sign-up page. 5. Create a AddressReport ID. This will be your AddressReport login ID and cannot be changed, so think of one that is secure and easy to remember. 6. Create a AddressReport password. You can change your password at any time. 7. Enter your Password Reset Question and Answer. This can be used at a later time if you forget your password. 8. Enter your e-mail address. You will receive e-mail notification when new information is available in [address] 9. Click Sign Up. You can now view and download portions of your medical record. 10. Click the Download Summary menu link to download a portable copy of your medical information. Additional Information    If you have questions, please visit the Frequently Asked Questions section of the AddressReport website at [email or web address] Remember, AddressReport is NOT to be used for urgent needs. For medical emergencies, dial 911. Discharge Instructions       PATIENT ID: Lissette Lam Sr.   MRN: [id number]   YOB: 1985    DATE OF ADMISSION: 2017 10:57 AM    DATE OF DISCHARGE: 2017    PRIMARY CARE PROVIDER: Maty Aponte MD     ATTENDING PHYSICIAN: Asha Kelley, MD  DISCHARGING PROVIDER: Chayito Leon NP    To contact this individual call [phone] and ask the  to page. If unavailable ask to be transferred the Adult Hospitalist Department. DISCHARGE DIAGNOSES Bilateral Pulmonary Emboli, Pneumonia    CONSULTATIONS: IP CONSULT TO HEMATOLOGY    PROCEDURES/SURGERIES: * No surgery found *    PENDING TEST RESULTS:   At the time of discharge the following test results are still pending: none    FOLLOW UP APPOINTMENTS:   Follow-up Information     Follow up With Details Comments Estrella Conroy MD Go on 7/20/2017 For new patient appointment on 7/20/17 at 11:45 AM. Please arrive 30 minutes early and bring your insurance card, ID, and list of medications. [facility]  [phone]           ADDITIONAL CARE RECOMMENDATIONS:   1. We have prescribed you the following new medications:  -Eliquis, this is the blood thinner for the blood clots in your lungs. You will need to take 10mg (2 tabs) twice daily for 2 days then you can decrease to 5mg (1 tablet) twice daily until further directed. -Augmentin this is antibiotic for pneumonia. Make sure to take with food as it can cause stomach upset. -Percocet you can take this as needed for pain. Please not this is a narcotic that can make you drowsy. Do not drive after taking the medication. See below for more information on the medications to include common side effects. 2. Make sure to use the incentive spirometer 10 times a hour while awake. 3. Recommending you following up hematology at [facility] for further clotting disorder work up. DIET: Regular    ACTIVITY: as tolerated    WOUND CARE: none    EQUIPMENT needed: none    DISCHARGE MEDICATIONS:  Apixaban (By mouth)   Apixaban (a-PIX-a-ban)  Treats and prevents blood clots. This medicine is a blood thinner. Brand Name(s): Eliquis   There may be other brand names for this medicine.   When This Medicine Should Not Be Used: This medicine is not right for everyone. Do not use it if you had an allergic reaction to apixaban or you have active bleeding. How to Use This Medicine:   Tablet  · Your doctor will tell you how much medicine to use. Do not use more than directed. · If you are not able to swallow the tablets whole, they may be crushed and mixed in water, 5% dextrose in water (D5W), apple juice, or applesauce. The crushed tablets may be mixed with 60 mL of water or D5W dose and given through a nasogastric tube (NGT). · This medicine should come with a Medication Guide. Ask your pharmacist for a copy if you do not have one. · Missed dose: Take a dose as soon as you remember. If it is almost time for your next dose, wait until then and take a regular dose. Do not take extra medicine to make up for a missed dose. · Store the medicine in a closed container at room temperature, away from heat, moisture, and direct light. Drugs and Foods to Avoid:   Ask your doctor or pharmacist before using any other medicine, including over-the-counter medicines, vitamins, and herbal products. · Some medicines can affect how apixaban works. Tell your doctor if you are using any of the following:   ¨ Carbamazepine, clarithromycin, itraconazole, ketoconazole, phenytoin, rifampin, ritonavir, Long's wort  ¨ Blood thinner (including clopidogrel, heparin, prasugrel, warfarin)  ¨ Medicine to treat depression  ¨ NSAID pain or arthritis medicine (including aspirin, celecoxib, diclofenac, ibuprofen, naproxen)  Warnings While Using This Medicine:   · Tell your doctor if you are pregnant or breastfeeding, or if you have kidney disease, liver disease, bleeding problems, or an artificial heart valve. · Do not stop using this medicine suddenly without asking your doctor. You might have a higher risk of stroke for a short time after you stop using this medicine.   · This medicine increases your risk for bleeding that can become serious if not controlled. You may also bruise easily, and it may take longer than usual for bleeding to stop. · This medicine may increase your risk for blood clots in your spine or back if you undergo an epidural or spinal puncture. This could lead to paralysis. Tell your doctor if you ever had spine problems or back surgery. · Tell any doctor or dentist who treats you that you are using this medicine. With your doctor's supervision, you may need to stop using this medicine several days before you have surgery or medical tests. · Your doctor will do lab tests at regular visits to check on the effects of this medicine. Keep all appointments. · Keep all medicine out of the reach of children. Never share your medicine with anyone. Possible Side Effects While Using This Medicine:   Call your doctor right away if you notice any of these side effects:  · Allergic reaction: Itching or hives, swelling in your face or hands, swelling or tingling in your mouth or throat, chest tightness, trouble breathing  · Change in how much or how often you urinate, red or pink urine  · Chest pain, trouble breathing  · Coughing up blood, vomiting blood or material that looks like coffee grounds  · Numbness, tingling, or muscle weakness in your legs or feet  · Red or black, tarry stools  · Unusual bleeding, bruising, or weakness  If you notice other side effects that you think are caused by this medicine, tell your doctor. Call your doctor for medical advice about side effects. You may report side effects to FDA at 3-156-FDA-8224  © 2017 [address] [employer] Information is for End User's use only and may not be sold, redistributed or otherwise used for commercial purposes. The above information is an  only. It is not intended as medical advice for individual conditions or treatments.  Talk to your doctor, nurse or pharmacist before following any medical regimen to see if it is safe and effective for you.    Amoxicillin/Clavulanate Potassium (By mouth)   Amoxicillin (a-jkn-s-SCOTT-in), Clavulanate Potassium (NPBK-wh-kd-fiona wgc-VGD-zp-um)  Treats infections. This medicine is a penicillin antibiotic. Brand Name(s): Augmentin, Augmentin ES-600, Augmentin XR   There may be other brand names for this medicine. When This Medicine Should Not Be Used: This medicine is not right for everyone. Do not use it if you had an allergic reaction to amoxicillin, clavulanate, or a similar antibiotic (penicillin or cephalosporin), or if you had liver problems caused by Augmentin®. How to Use This Medicine:   Liquid, Tablet, Chewable Tablet, Long Acting Tablet  · Your doctor will tell you how much medicine to use. Do not use more than directed. · Take this medicine with a snack or at the beginning of a meal to help prevent nausea. · Chewable tablets: Chew the tablet completely before you swallow it. · Measure the oral liquid medicine with a marked measuring spoon, oral syringe, or medicine cup. Shake the medicine well just before you measure each dose. Rinse the spoon or dropper after each use. · Swallow the extended-release tablet whole. Do not crush, break, or chew it. · Take all of the medicine in your prescription to clear up your infection, even if you feel better after the first few doses. · Missed dose: Take a dose as soon as you remember. If it is almost time for your next dose, wait until then and take a regular dose. Do not take extra medicine to make up for a missed dose. · Tablet, extended-release tablet, chewable tablet: Store at room temperature, away from heat, moisture, and direct light. · Oral liquid: Store in the refrigerator. Do not freeze. · Throw away any unused oral liquid after 10 days. Drugs and Foods to Avoid:   Ask your doctor or pharmacist before using any other medicine, including over-the-counter medicines, vitamins, and herbal products.   · Some medicines can affect how this medicine works. Tell your doctor if you are taking a blood thinner (such as warfarin), allopurinol, or probenecid. Warnings While Using This Medicine:   · Tell your doctor if you are pregnant or breastfeeding, or if you have kidney disease, liver disease, or mononucleosis (mono). · Birth control pills may not work as well while you are taking this medicine. Use another form of birth control to prevent pregnancy. · This medicine can cause diarrhea. Call your doctor if the diarrhea becomes severe, does not stop, or is bloody. Do not take any medicine to stop diarrhea until you have talked to your doctor. Diarrhea can occur 2 months or more after you stop taking this medicine. · Tell any doctor or dentist who treats you that you are using this medicine. This medicine may affect certain medical test results. · Call your doctor if your symptoms do not improve or if they get worse. · The chewable tablet and oral liquid contain phenylalanine. Talk to your doctor before you use this medicine if you have phenylketonuria (PKU). · Keep all medicine out of the reach of children. Never share your medicine with anyone. Possible Side Effects While Using This Medicine:   Call your doctor right away if you notice any of these side effects:  · Allergic reaction: Itching or hives, swelling in your face or hands, swelling or tingling in your mouth or throat, chest tightness, trouble breathing  · Blistering, peeling, red skin rash  · Change in how much or how often you urinate  · Dark urine or pale stools, nausea, vomiting, loss of appetite, stomach pain, yellow eyes or skin  · Diarrhea that may contain blood, stomach cramps  If you notice these less serious side effects, talk with your doctor:   · Diaper rash  · Mild diarrhea, nausea, vomiting  · Tooth discoloration (in children)  If you notice other side effects that you think are caused by this medicine, tell your doctor. Call your doctor for medical advice about side effects.  You may report side effects to FDA at 0-785-FDA-6435  © 2017 [facility] Information is for End User's use only and may not be sold, redistributed or otherwise used for commercial purposes. The above information is an  only. It is not intended as medical advice for individual conditions or treatments. Talk to your doctor, nurse or pharmacist before following any medical regimen to see if it is safe and effective for you. Oxycodone/Acetaminophen (By mouth)   Acetaminophen (m-vtco-g-MIN-oh-fen), Oxycodone Hydrochloride (uk-u-EQU-done shabnam-droe-KLOR-jayna)  Treats moderate to moderately severe pain. This medicine is a narcotic pain reliever. Brand Name(s): Endocet, Percocet, Primlev, Roxicet, Xartemis XR   There may be other brand names for this medicine. When This Medicine Should Not Be Used: This medicine is not right for everyone. Do not use it if you had an allergic reaction to acetaminophen or oxycodone, or if you have serious breathing problems or paralytic ileus. How to Use This Medicine:   Capsule, Liquid, Tablet, Long Acting Tablet  · Your doctor will tell you how much medicine to use. Do not use more than directed. · An overdose can be dangerous. Follow directions carefully so you do not get too much medicine at one time. · Oral liquid: Measure the oral liquid medicine with a marked measuring spoon, oral syringe, or medicine cup. · Swallow the extended-release tablet whole. Do not crush, break, or chew it. Do not lick or wet the tablet before placing it in your mouth. Do not give this medicine through a feeding tube. · This medicine should come with a Medication Guide. Ask your pharmacist for a copy if you do not have one. · Missed dose: If you miss a dose of this medicine, skip the missed dose and go back to your regular dosing schedule. Do not double doses. · Store the medicine in a closed container at room temperature, away from heat, moisture, and direct light.  Ask your pharmacist about the best way to dispose of medicine you do not use. Drugs and Foods to Avoid:   Ask your doctor or pharmacist before using any other medicine, including over-the-counter medicines, vitamins, and herbal products. · Do not use Xartemis XR if you are using or have used an MAO inhibitor in the past 14 days. · Some medicines can affect how this medicine works. Tell your doctor if you are using any of the following:   ¨ Carbamazepine, erythromycin, ketoconazole, lamotrigine, mirtazapine, naltrexone, phenytoin, propranolol, rifampin, ritonavir, tramadol, trazodone, or zidovudine  ¨ Birth control pills  ¨ Diuretic (water pill)  ¨ Medicine to treat depression  ¨ Phenothiazine medicine  ¨ Triptan medicine to treat migraine headaches  · Do not drink alcohol while you are using this medicine. Acetaminophen can damage your liver, and alcohol can increase this risk. Do not take acetaminophen without asking your doctor if you have 3 or more drinks of alcohol every day. · Tell your doctor if you use anything else that makes you sleepy. Some examples are allergy medicine, narcotic pain medicine, and alcohol. Tell your doctor if you are using buprenorphine, butorphanol, nalbuphine, pentazocine, a benzodiazepine, or a muscle relaxer. Warnings While Using This Medicine:   · Tell your doctor if you are pregnant or breastfeeding, or if you have kidney disease, liver disease, heart disease, low blood pressure, breathing problems or lung disease (such as asthma, COPD), thyroid problems, Emanuel disease, pancreas or gallbladder problems, prostate problems, trouble urinating, or a stomach problems, or a history of head injury or brain damage, seizures, or alcohol or drug abuse. Tell your doctor if you are allergic to codeine.   · This medicine may cause the following problems:  ¨ High risk of overdose, which can lead to death  ¨ Respiratory depression (serious breathing problem that can be life-threatening)  ¨ Liver problems  ¨ Serious skin reactions  ¨ Serotonin syndrome (when used with certain medicines)  · This medicine may make you dizzy or drowsy. Do not drive or do anything that could be dangerous until you know how this medicine affects you. Sit or lie down if you feel dizzy. Stand up carefully. · This medicine contains acetaminophen. Read the labels of all other medicines you are using to see if they also contain acetaminophen, or ask your doctor or pharmacist. Barry Pierson not use more than 4 grams (4,000 milligrams) total of acetaminophen in one day. · This medicine can be habit-forming. Do not use more than your prescribed dose. Call your doctor if you think your medicine is not working. · Do not stop using this medicine suddenly. Your doctor will need to slowly decrease your dose before you stop it completely. · This medicine could cause infertility. Talk with your doctor before using this medicine if you plan to have children. · This medicine may cause constipation, especially with long-term use. Ask your doctor if you should use a laxative to prevent and treat constipation. · Keep all medicine out of the reach of children. Never share your medicine with anyone.   Possible Side Effects While Using This Medicine:   Call your doctor right away if you notice any of these side effects:  · Allergic reaction: Itching or hives, swelling in your face or hands, swelling or tingling in your mouth or throat, chest tightness, trouble breathing  · Anxiety, restlessness, fast heartbeat, fever, muscle spasms, twitching, diarrhea, seeing or hearing things that are not there  · Blistering, peeling, red skin rash  · Blue lips, fingernails, or skin  · Dark urine or pale stools, loss of appetite, stomach pain, yellow skin or eyes  · Extreme weakness, shallow breathing, uneven heartbeat, seizures, sweating, or cold or clammy skin  · Severe confusion, lightheadedness, dizziness, or fainting  · Severe constipation, nausea, or vomiting  · Trouble breathing or slow breathing  If you notice these less serious side effects, talk with your doctor:   · Headache  · Mild constipation, nausea, or vomiting  · Mild sleepiness or drowsiness  If you notice other side effects that you think are caused by this medicine, tell your doctor. Call your doctor for medical advice about side effects. You may report side effects to FDA at 2-436-GZH-8866  © 2017 [address] [employer] Information is for End User's use only and may not be sold, redistributed or otherwise used for commercial purposes. The above information is an  only. It is not intended as medical advice for individual conditions or treatments. Talk to your doctor, nurse or pharmacist before following any medical regimen to see if it is safe and effective for you. See Medication Reconciliation Form    · It is important that you take the medication exactly as they are prescribed. · Keep your medication in the bottles provided by the pharmacist and keep a list of the medication names, dosages, and times to be taken in your wallet. · Do not take other medications without consulting your doctor. NOTIFY YOUR PHYSICIAN FOR ANY OF THE FOLLOWING:   Fever over 101 degrees for 24 hours. Chest pain, shortness of breath, fever, chills, nausea, vomiting, diarrhea, change in mentation, falling, weakness, bleeding. Severe pain or pain not relieved by medications. Or, any other signs or symptoms that you may have questions about. DISPOSITION:   X Home With:   OT  PT  HH  RN       SNF/Inpatient Rehab/LTAC    Independent/assisted living    Hospice    Other:     CDMP Checked: Yes X     PROBLEM LIST Updated:   Yes X       Signed:   Anneliese Monique NP  7/5/2017

## 2017-07-05 NOTE — PROGRESS NOTES
[address] July 5, 2017       RE: Mirna Carrington Sr. To Whom It May Concern,    This is to certify that Sheyla Campo. was in the hospital from 6/30/2017 to 7/5/2017. He needs to be cleared by his primary care provider prior to returning to work. Please feel free to contact my office if you have any questions or concerns. Thank you for your assistance in this matter.       Sincerely,        Swati Wilson, VIVIEN  [phone]

## 2017-07-07 ENCOUNTER — TELEPHONE (OUTPATIENT)
Dept: ONCOLOGY | Age: 32
End: 2017-07-07

## 2017-07-07 LAB
COMMENT, 511217: NORMAL
F5 GENE MUT ANL BLD/T: NORMAL

## 2017-07-07 NOTE — TELEPHONE ENCOUNTER
----- Message from Melinda Benavides NP sent at 7/5/2017  2:32 PM EDT -----  Regarding: RE: Referral Needed  Referral placed. Thanks!  ----- Message -----     From: Nick Vega NP     Sent: 7/5/2017   2:29 PM       To: Melinda Benavides NP  Subject: FW: Referral Needed                                  ----- Message -----     From: Kalyan Calvin LPN     Sent: 6/8/6396   8:56 AM       To: Nick Vega NP, [facility]  Subject: Referral Needed                                  Hi Mary Augustine,    Can you please put in a referral for the following patient. Thank!!  ----- Message -----     From: Ashley Littlejohn MD     Sent: 7/1/2017  10:19 AM       To: [facility]    Could you refer him to hematology at [facility] in [facility] for pulmonary embolism? Thanks!

## 2017-07-07 NOTE — TELEPHONE ENCOUNTER
Provider referring patient to hematology at [facility] to be evaluated/management of unprovoked pulmonary embolism. Doctors office requesting faxed medical records before scheduling appointment.  Medical records faxed to [phone]

## 2017-07-10 NOTE — TELEPHONE ENCOUNTER
2nd call placed to patient; message left to return call. Writer trying to inform pt referral and requested medical records sent to [facility]- Hematology. Pt will receive a call from doctors office to schedule appointment.

## 2017-07-11 ENCOUNTER — DOCUMENTATION ONLY (OUTPATIENT)
Dept: ONCOLOGY | Age: 32
End: 2017-07-11

## 2017-07-11 NOTE — PROGRESS NOTES
Looks like this pt needs to be seen at [facility]  Not f/u with me tmw until after this appt  Dr Teofilo Kimble pt seen once by us  Needs PCP f/u

## 2017-07-12 ENCOUNTER — OFFICE VISIT (OUTPATIENT)
Dept: ONCOLOGY | Age: 32
End: 2017-07-12

## 2017-07-12 ENCOUNTER — TELEPHONE (OUTPATIENT)
Dept: ONCOLOGY | Age: 32
End: 2017-07-12

## 2017-07-12 VITALS
RESPIRATION RATE: 16 BRPM | DIASTOLIC BLOOD PRESSURE: 76 MMHG | OXYGEN SATURATION: 100 % | TEMPERATURE: 99 F | SYSTOLIC BLOOD PRESSURE: 125 MMHG | HEIGHT: 73 IN | WEIGHT: 214.6 LBS | BODY MASS INDEX: 28.44 KG/M2 | HEART RATE: 70 BPM

## 2017-07-12 DIAGNOSIS — R07.89 CHEST WALL PAIN: ICD-10-CM

## 2017-07-12 DIAGNOSIS — J15.6 PNEUMONIA DUE TO GRAM-NEGATIVE BACTERIA (HCC): ICD-10-CM

## 2017-07-12 DIAGNOSIS — I26.99 OTHER ACUTE PULMONARY EMBOLISM WITHOUT ACUTE COR PULMONALE (HCC): Primary | ICD-10-CM

## 2017-07-12 DIAGNOSIS — R06.02 SOB (SHORTNESS OF BREATH): ICD-10-CM

## 2017-07-12 RX ORDER — PREDNISONE 20 MG/1
20 TABLET ORAL
COMMUNITY
Start: 2017-01-03

## 2017-07-12 RX ORDER — NAPROXEN 500 MG/1
500 TABLET ORAL
COMMUNITY
Start: 2017-01-03

## 2017-07-12 RX ORDER — OXYCODONE AND ACETAMINOPHEN 5; 325 MG/1; MG/1
1 TABLET ORAL
Qty: 30 TAB | Refills: 0 | Status: SHIPPED | OUTPATIENT
Start: 2017-07-12

## 2017-07-12 RX ORDER — GUAIFENESIN 100 MG/5ML
81 LIQUID (ML) ORAL
COMMUNITY

## 2017-07-12 NOTE — PROGRESS NOTES
NEW HEME/ONC CONSULT       [facility] is a 29 y.o. 1985 male and presents with Other (PE/hospital follow up )    CC PE    Interim Hx:    Mr. Oneil Guthrie is a 29 y.o. male seen today for office f/u from hospital consult for pulmonary embolism. Chest wall pain on right side still bad. 8/10 still. Ran out of percocet and needs more as this was working. On eliquis bid and no problems. Pt is [facility] and is going to [facility] here and needs PCP here. Lives in [place]. Has questions about needing 02. Mom is here today. Hx from hospital:   He has no other PMH. He developed shortness of breath and right shoulder pain over the proceeding 2-3 days. Also with pleuritic chest pain and dizziness. He ultimately came to the ER and had a CTA chest which revealed pulmonary emboli.   Leading up to the blood clot, he had no long distance travel, no surgery, no trauma, or immobilization.   He did travel from [place] recently.    He was started on Lovenox and then Eliquis.     No epistaxis, no hemoptysis, no hematemesis, no blood per rectum, no black or tarry stool. No hematuria. Right now his chest pain persists. Breathing is a little better. No fevers, chills, night sweats. No unexpected weight loss. No new lumps or bumps. No nausea, vomiting, diarrhea, or constipation. No personal or family history of blood clots. He is in the [employer] and also works as a  in [employer]. DX   Encounter Diagnoses   Name Primary?  Other acute pulmonary embolism without acute cor pulmonale (HCC) Yes    Pneumonia due to Gram-negative bacteria (HCC)     SOB (shortness of breath)     Chest wall pain               No past medical history on file. No past surgical history on file.   Social History     Social History    Marital status: SINGLE     Spouse name: N/A    Number of children: N/A    Years of education: N/A     Social History Main Topics    Smoking status: Never Smoker    Smokeless tobacco: Never Used    Alcohol use Yes      Comment: \"social\"    Drug use: No    Sexual activity: Yes     Partners: Female     Birth control/ protection: None     Other Topics Concern    None     Social History Narrative    ** Merged History Encounter **          Family History   Problem Relation Age of Onset    Hypertension Mother        Current Outpatient Prescriptions   Medication Sig Dispense Refill    oxyCODONE-acetaminophen (PERCOCET) 5-325 mg per tablet Take 1 Tab by mouth two (2) times daily as needed for Pain. Max Daily Amount: 2 Tabs. Indications: Pain 30 Tab 0    apixaban (ELIQUIS) 5 mg tablet Take 1 Tab by mouth two (2) times a day. 60 Tab 1    aspirin 81 mg chewable tablet Take 81 mg by mouth.  naproxen (NAPROSYN) 500 mg tablet Take 500 mg by mouth.  predniSONE (DELTASONE) 20 mg tablet Take 20 mg by mouth. No Known Allergies    Review of Systems    A comprehensive review of systems was negative except for: per HPI       Objective:  Visit Vitals    /76    Pulse 70    Temp 99 °F (37.2 °C) (Oral)    Resp 16    Ht 6' 1\" (1.854 m)    Wt 214 lb 9.6 oz (97.3 kg)    SpO2 100%    BMI 28.31 kg/m2         Physical Exam:   General appearance - WN man in NAD  Mental status - alert, conversant  EYE-conj clear  Mouth - mucous membranes moist, pharynx normal without lesions  Neck - supple, no significant adenopathy   Chest - clear to auscultation, no wheezes, rales or rhonchi, symmetric air entry   Heart - normal rate and regular rhythm   Abdomen - soft, nontender  Ext-no pedal edema noted  Skin-Warm and dry.    Neuro -alert, oriented, normal speech, no focal findings or movement disorder noted      Diagnostic Imaging   reviewed  Results for orders placed during the hospital encounter of 06/30/17   XR CHEST PA LAT    Narrative Exam:  2 view chest      HISTORY: Follow-up abnormal chest x-ray  Indication: Dyspnea    COMPARISON: 7/1/2017    FINDINGS:    PA and lateral views demonstrate normal heart size. Basilar atelectasis on the  right and left. Bilateral pleural effusions. No pneumothorax. The osseous  structures are unremarkable. Impression IMPRESSION:  Bibasilar atelectasis/scattered airspace opacity. Trace bilateral effusions. Results for orders placed during the hospital encounter of 06/30/17   CTA CHEST W OR W WO CONT    Narrative EXAM:  CTA CHEST W OR W WO CONT    INDICATION:  Right-sided pleuritic pain for 2 days and elevated d dimer    COMPARISON: None. CONTRAST:  85 mL of Isovue-370. TECHNIQUE:   Precontrast  images were obtained to localize the volume for acquisition. Multislice helical CT arteriography was performed from the diaphragm to the  thoracic inlet during uneventful rapid bolus intravenous contrast  administration. Lung and soft tissue windows were generated. Coronal and  sagittal images were generated and 3D post processing consisting of coronal  maximum intensity images was performed. CT dose reduction was achieved through  use of a standardized protocol tailored for this examination and automatic  exposure control for dose modulation. FINDINGS:  The lungs are clear of mass, nodule, airspace disease or edema. There are emboli extending into the right lower lobe with associated moderate  peripheral airspace disease. A few peripheral emboli are noted in the inferior  peripheral branches of the left lower lobe with associated minimal peripheral  airspace disease. There is no mediastinal or hilar adenopathy or mass. The aorta  enhances normally without evidence of aneurysm or dissection. The visualized portions of the upper abdominal organs are normal.      Impression IMPRESSION:     Positive bilateral lower lobe pulmonary emboli with presumed peripheral  infarctions as described. Anthon more significant on the right. This result was verbally relayed to Ricardo Pepe at 1349 hours by myself.   [employer] Results  reviewed  Lab Results   Component Value Date/Time    WBC 9.8 07/03/2017 10:24 AM    HGB 12.5 07/03/2017 10:24 AM    HCT 39.0 07/03/2017 10:24 AM    PLATELET 291 32/31/8683 10:24 AM    MCV 71.8 07/03/2017 10:24 AM       Lab Results   Component Value Date/Time    Sodium 135 07/02/2017 11:51 AM    Potassium 3.7 07/02/2017 11:51 AM    Chloride 101 07/02/2017 11:51 AM    CO2 29 07/02/2017 11:51 AM    Anion gap 5 07/02/2017 11:51 AM    Glucose 92 07/02/2017 11:51 AM    BUN 5 07/02/2017 11:51 AM    Creatinine 0.87 07/02/2017 11:51 AM    BUN/Creatinine ratio 6 07/02/2017 11:51 AM    GFR est AA >60 07/02/2017 11:51 AM    GFR est non-AA >60 07/02/2017 11:51 AM    Calcium 9.0 07/02/2017 11:51 AM    AST (SGOT) 8 07/02/2017 11:51 AM    Alk. phosphatase 70 07/02/2017 11:51 AM    Protein, total 8.1 07/02/2017 11:51 AM    Albumin 3.1 07/02/2017 11:51 AM    Globulin 5.0 07/02/2017 11:51 AM    A-G Ratio 0.6 07/02/2017 11:51 AM    ALT (SGPT) 22 07/02/2017 11:51 AM       .    Assessment/Plan:    ASSESSMENT  Mr. Davis is a 29 y.o. male seen today for office f/u of pulmonary emboli/ seen by Dr Timothy Gomez in hospital consult.      DISCUSSION/PLAN    1. Pulmonary embolism unprovoked on eliquis. This was an unprovoked venous thromboembolism. Per the 2016 ACCP guidelines for Antithrombotic Therapy for VTE Disease, this should be treated with long term anticoagulation as long as the benefits of anticoagulation outweigh the risks. We discussed these guidelines in detail and the fact that he could be at risk for recurrent blood clots on cessation of anticoagulation.     2. Hypercoagulable state. Workup negative by ER. needs to do prothrombin mutation.      3. Choice of anticoagulant. on eliquis and tolerating fine. Prior review: He requires systemic anticoagulation. Options include coumadin, low molecular weight heparin, or novel anticoagulants such as Eliquis.   We discussed that trials have showed that this this agent is non-inferior to coumadin with a favorable toxicity profile. We discussed that there is no reversal agent for Eliquis, but in trials it has less bleeding. We also discussed the current class action suites suggesting that bleeding was underrepresented in trials for the novel oral anticoagulants.     4. Pulmonary infarction. Should improve with time. 5.  Chest wall pain due to above. Refill percocet. Taking bid as needed. 6.  Needs PCP/ lives here and [place]. Also goes to [facility]. Likely will only f/u with us prn. / police force. Mom here today. F/u here 2 weeks as needed  Call if questions. ICD-10-CM ICD-9-CM    1. Other acute pulmonary embolism without acute cor pulmonale (HCC) I26.99 415.19    2. Pneumonia due to Gram-negative bacteria (HCC) J15.6 482.83    3. SOB (shortness of breath) R06.02 786.05    4. Chest wall pain R07.89 786.52        Current Outpatient Prescriptions   Medication Sig    oxyCODONE-acetaminophen (PERCOCET) 5-325 mg per tablet Take 1 Tab by mouth two (2) times daily as needed for Pain. Max Daily Amount: 2 Tabs. Indications: Pain    apixaban (ELIQUIS) 5 mg tablet Take 1 Tab by mouth two (2) times a day.  aspirin 81 mg chewable tablet Take 81 mg by mouth.  naproxen (NAPROSYN) 500 mg tablet Take 500 mg by mouth.  predniSONE (DELTASONE) 20 mg tablet Take 20 mg by mouth. No current facility-administered medications for this visit. There are no Patient Instructions on file for this visit. Follow-up Disposition:  Return in about 2 weeks (around 7/26/2017).     Eula Taylor DO

## 2017-07-12 NOTE — TELEPHONE ENCOUNTER
----- Message from Caitlin Stanley LPN sent at 5/96/2848  5:06 PM EDT -----  Regarding: Referral  Patient informed of referral to [facility]. Patient stated he will be in [facility] for another month before he returns to [place]. Patient stated he don't know why he's going to [facility] because he's not going to [place]. Please advise.   Thanks!!

## 2017-07-12 NOTE — TELEPHONE ENCOUNTER
Patient scheduled to see Dr. Jose Sanchez today at 2pm. Patient needs to see PCP per Dr. Jose Sanchez. HIPPA verified. Patient informed he needs to see his PCP per provider. Patient stated he does not have a PCP. He stated he lives in [place] but he came to [place] and that's when he got sick. Patient states he will be going back to [place] in 2 weeks and he was told he needed to be seen for his condition. Provider informed. Okay for OV today at 2pm per provider. Pt informed. Patient verbalized understanding. Writer thanked for call.

## 2017-07-12 NOTE — MR AVS SNAPSHOT
Visit Information Date & Time Provider Department Dept. Phone Encounter #  
 7/12/2017  2:00 PM Norris Kilgore, [address]  Oncology at [address] [id number] Follow-up Instructions Return in about 2 weeks (around 7/26/2017). Your Appointments 7/20/2017 11:45 AM  
Any with Dean Gunter MD  
[facility] Internal Medicine [address]) Appt Note: np est pcp lw 7/5/2017  
 Jayant Scott [address] [place] [address]  
[phone]  
  
   
 [facility] [address]  
  
    
 7/26/2017 10:00 AM  
Follow Up with Norris Kilgore DO [facility] Oncology at [facility]) Appt Note: 2 week f/u on PE  
 [address] [address]  
[phone]  
  
   
 [address] [facility] [address] Upcoming Health Maintenance Date Due DTaP/Tdap/Td series (1 - Tdap) 5/19/2008 INFLUENZA AGE 9 TO ADULT 8/1/2017 Allergies as of 7/12/2017  Review Complete On: 7/12/2017 By: Norris Kilgore DO No Known Allergies Current Immunizations  Reviewed on 7/12/2017 No immunizations on file. Reviewed by Samantha Lebron LPN on 0/13/3899 at  1:50 PM  
You Were Diagnosed With   
  
 Codes Comments Other acute pulmonary embolism without acute cor pulmonale (HCC)    -  Primary ICD-10-CM: I26.99 
ICD-9-CM: 415.19 Pneumonia due to Gram-negative bacteria ([facility]     ICD-10-CM: J15.6 ICD-9-CM: 482.83   
 SOB (shortness of breath)     ICD-10-CM: R06.02 
ICD-9-CM: 786.05 Chest wall pain     ICD-10-CM: R07.89 ICD-9-CM: 786.52 Vitals BP Pulse Temp Resp Height(growth percentile) Weight(growth percentile) 125/76 70 99 °F (37.2 °C) (Oral) 16 6' 1\" (1.854 m) 214 lb 9.6 oz (97.3 kg) SpO2 BMI Smoking Status 100% 28.31 kg/m2 Never Smoker Vitals History BMI and BSA Data Body Mass Index Body Surface Area 28.31 kg/m 2 2.24 m 2 Preferred Pharmacy Pharmacy Name Phone [id number], [address] [place] [address] [phone] Your Updated Medication List  
  
   
This list is accurate as of: 7/12/17  3:34 PM.  Always use your most recent med list.  
  
  
  
  
 apixaban 5 mg tablet Commonly known as:  Pattricia Boom Take 1 Tab by mouth two (2) times a day. aspirin 81 mg chewable tablet Take 81 mg by mouth. naproxen 500 mg tablet Commonly known as:  NAPROSYN Take 500 mg by mouth. oxyCODONE-acetaminophen 5-325 mg per tablet Commonly known as:  PERCOCET Take 1 Tab by mouth two (2) times daily as needed for Pain. Max Daily Amount: 2 Tabs. Indications: Pain  
  
 predniSONE 20 mg tablet Commonly known as:  Radha Loth Take 20 mg by mouth. Prescriptions Printed Refills  
 oxyCODONE-acetaminophen (PERCOCET) 5-325 mg per tablet 0 Sig: Take 1 Tab by mouth two (2) times daily as needed for Pain. Max Daily Amount: 2 Tabs. Indications: Pain Class: Print Route: Oral  
  
Follow-up Instructions Return in about 2 weeks (around 7/26/2017). Introducing [facility]! [facility] introduces [employer] patient portal. Now you can access parts of your medical record, email your doctor's office, and request medication refills online. 1. In your internet browser, go to [email or web address] 2. Click on the First Time User? Click Here link in the Sign In box. You will see the New Member Sign Up page. 3. Enter your [employer] Access Code exactly as it appears below. You will not need to use this code after youve completed the sign-up process. If you do not sign up before the expiration date, you must request a new code. · [employer] Access Code: [email or web address] Expires: 9/29/2017  4:54 PM 
 
4.  Enter the last four digits of your Social Security Number (xxxx) and Date of Birth (mm/dd/yyyy) as indicated and click Submit. You will be taken to the next sign-up page. 5. Create a [employer] ID. This will be your [employer] login ID and cannot be changed, so think of one that is secure and easy to remember. 6. Create a [employer] password. You can change your password at any time. 7. Enter your Password Reset Question and Answer. This can be used at a later time if you forget your password. 8. Enter your e-mail address. You will receive e-mail notification when new information is available in [address] 9. Click Sign Up. You can now view and download portions of your medical record. 10. Click the Download Summary menu link to download a portable copy of your medical information. If you have questions, please visit the Frequently Asked Questions section of the [employer] website. Remember, [employer] is NOT to be used for urgent needs. For medical emergencies, dial 911. Now available from your iPhone and Android! Please provide this summary of care documentation to your next provider. If you have any questions after today's visit, please call [phone].

## 2017-07-12 NOTE — PROGRESS NOTES
Rocky Lugo. is a 29 y.o. male here today for follow up of PE. States he is in constant pain. This is to right rib cage/flank area. Ran out of percocet this morning. States he \"struggles to breathe\" at times. Becomes short of breath easily. This occurs both during the day and night. States concerned about his lungs & oxygen level (02 sat 100 % room air today). Used to be active in sports, states he has stopped all activities due to this. Walking pulse ox completed. 02 at rest 98%, pulse 69.  02 with ambulation 96%, pulse 92.

## 2017-07-25 ENCOUNTER — TELEPHONE (OUTPATIENT)
Dept: ONCOLOGY | Age: 32
End: 2017-07-25

## 2017-07-25 NOTE — LETTER
7/25/2017 1:00 PM 
 
Mr. Christian Kirk. 
[address] [address] To Whom It May Concern: 
 
 
Sujit Idaliajosé manuel is currently under the care of Dr. Ly Bustillos for pulmonary embolism and pulmonary infarct. Treatment requires ongoing blood thinners and monitoring for this serious condition. Please have the patient contact our office for any questions at [phone]. Sincerely, Hipolito Ling, DO

## 2017-07-25 NOTE — TELEPHONE ENCOUNTER
HIPAA verified. Requests letter stating diagnosis of pulmonary embolism and pulmonary infarct for work. Will  at visit tomorrow.

## 2017-07-25 NOTE — TELEPHONE ENCOUNTER
Pt called and needs a letter stating his condition for his employment. Pt has an appointment tomorrow and wants to  letter while he is here.

## 2017-07-26 ENCOUNTER — OFFICE VISIT (OUTPATIENT)
Dept: ONCOLOGY | Age: 32
End: 2017-07-26

## 2017-07-26 ENCOUNTER — TELEPHONE (OUTPATIENT)
Dept: ONCOLOGY | Age: 32
End: 2017-07-26

## 2017-07-26 VITALS
HEIGHT: 73 IN | BODY MASS INDEX: 28.65 KG/M2 | SYSTOLIC BLOOD PRESSURE: 143 MMHG | OXYGEN SATURATION: 98 % | RESPIRATION RATE: 16 BRPM | DIASTOLIC BLOOD PRESSURE: 83 MMHG | HEART RATE: 85 BPM | WEIGHT: 216.2 LBS | TEMPERATURE: 99.2 F

## 2017-07-26 DIAGNOSIS — R07.89 CHEST WALL PAIN: ICD-10-CM

## 2017-07-26 DIAGNOSIS — I26.99 OTHER ACUTE PULMONARY EMBOLISM WITHOUT ACUTE COR PULMONALE (HCC): Primary | ICD-10-CM

## 2017-07-26 DIAGNOSIS — R06.02 SOB (SHORTNESS OF BREATH): ICD-10-CM

## 2017-07-26 RX ORDER — OXYCODONE AND ACETAMINOPHEN 10; 325 MG/1; MG/1
TABLET ORAL
Refills: 0 | COMMUNITY
Start: 2017-07-05

## 2017-07-26 NOTE — PROGRESS NOTES
Cathryn Alex. is a 29 y.o. male here today for follow up of PE. Would like DMV papers signed for handicap parking placard.   (has brought in today)

## 2017-07-26 NOTE — TELEPHONE ENCOUNTER
Call placed to patient. HIPAA verified. Notified of order for labs per provider. Will place in mail today along with map of [facility] locations. Confirmed mailing address.   Patient would like address changed to:  [address] [facility]   [address] [place] [address]    Will forward to [facility]

## 2017-07-26 NOTE — PROGRESS NOTES
HEME/ONC CONSULT       Skeet Record Sr. is a 29 y.o. 1985 male and presents with Other (PE)    CC PE    Interim Hx:    Mr. Callum Chua is a 29 y.o. male seen today for office f/u for PE on eliquis. Pt is still SOB some and has trouble walking long distances like at work. Needs DMV temporary parking pass. Works in [place]. Here with mom. Chest wall pain is getting better. No problems with eliquis. No bruising/ bleeding. Last visit:  f/u from hospital consult for pulmonary embolism. Chest wall pain on right side still bad. 8/10 still. Ran out of percocet and needs more as this was working. On eliquis bid and no problems. Pt is [facility] and is going to [facility] here and needs PCP here. Lives in [place]. Has questions about needing 02. Mom is here today. Hx from hospital:   He has no other PMH. He developed shortness of breath and right shoulder pain over the proceeding 2-3 days. Also with pleuritic chest pain and dizziness. He ultimately came to the ER and had a CTA chest which revealed pulmonary emboli.   Leading up to the blood clot, he had no long distance travel, no surgery, no trauma, or immobilization.   He did travel from [place] recently.    He was started on Lovenox and then Eliquis.     No epistaxis, no hemoptysis, no hematemesis, no blood per rectum, no black or tarry stool. No hematuria. Right now his chest pain persists. Breathing is a little better. No fevers, chills, night sweats. No unexpected weight loss. No new lumps or bumps. No nausea, vomiting, diarrhea, or constipation. No personal or family history of blood clots. He is in the [employer] and also works as a  in [facility]. DX   Encounter Diagnoses   Name Primary?  Other acute pulmonary embolism without acute cor pulmonale (HCC) Yes    SOB (shortness of breath)     Chest wall pain               No past medical history on file. No past surgical history on file.   Social History Social History    Marital status: SINGLE     Spouse name: N/A    Number of children: N/A    Years of education: N/A     Social History Main Topics    Smoking status: Never Smoker    Smokeless tobacco: Never Used    Alcohol use Yes      Comment: \"social\"    Drug use: No    Sexual activity: Yes     Partners: Female     Birth control/ protection: None     Other Topics Concern    None     Social History Narrative    ** Merged History Encounter **          Family History   Problem Relation Age of Onset    Hypertension Mother        Current Outpatient Prescriptions   Medication Sig Dispense Refill    apixaban (ELIQUIS) 5 mg tablet Take 1 Tab by mouth two (2) times a day. 60 Tab 1    apixaban (ELIQUIS) 5 mg tablet Take 5 mg by mouth.  oxyCODONE-acetaminophen (PERCOCET 10)  mg per tablet TK 1 T PO Q 4 H PRN. MAX DAILY AMOUNT IS 6 TABLETS.  0    aspirin 81 mg chewable tablet Take 81 mg by mouth.  naproxen (NAPROSYN) 500 mg tablet Take 500 mg by mouth.  predniSONE (DELTASONE) 20 mg tablet Take 20 mg by mouth.  oxyCODONE-acetaminophen (PERCOCET) 5-325 mg per tablet Take 1 Tab by mouth two (2) times daily as needed for Pain. Max Daily Amount: 2 Tabs. Indications: Pain 30 Tab 0       No Known Allergies    Review of Systems    A comprehensive review of systems was negative except for: per HPI       Objective:  Visit Vitals    /83    Pulse 85    Temp 99.2 °F (37.3 °C) (Oral)    Resp 16    Ht 6' 1\" (1.854 m)    Wt 216 lb 3.2 oz (98.1 kg)    SpO2 98%    BMI 28.52 kg/m2         Physical Exam:   General appearance - WN man in NAD  Mental status - alert, conversant  EYE-conj clear  Mouth - mucous membranes moist, pharynx normal without lesions  Neck - supple, no significant adenopathy   Chest - clear to auscultation, no wheezes, rales or rhonchi, symmetric air entry   Heart - normal rate and regular rhythm   Abdomen - soft, nontender  Ext-no pedal edema noted  Skin-Warm and dry. Neuro -alert, oriented, normal speech, no focal findings or movement disorder noted      Diagnostic Imaging   reviewed  Results for orders placed during the hospital encounter of 06/30/17   XR CHEST PA LAT    Narrative Exam:  2 view chest      HISTORY: Follow-up abnormal chest x-ray  Indication: Dyspnea    COMPARISON: 7/1/2017    FINDINGS:    PA and lateral views demonstrate normal heart size. Basilar atelectasis on the  right and left. Bilateral pleural effusions. No pneumothorax. The osseous  structures are unremarkable. Impression IMPRESSION:  Bibasilar atelectasis/scattered airspace opacity. Trace bilateral effusions. Results for orders placed during the hospital encounter of 06/30/17   CTA CHEST W OR W WO CONT    Narrative EXAM:  CTA CHEST W OR W WO CONT    INDICATION:  Right-sided pleuritic pain for 2 days and elevated d dimer    COMPARISON: None. CONTRAST:  85 mL of Isovue-370. TECHNIQUE:   Precontrast  images were obtained to localize the volume for acquisition. Multislice helical CT arteriography was performed from the diaphragm to the  thoracic inlet during uneventful rapid bolus intravenous contrast  administration. Lung and soft tissue windows were generated. Coronal and  sagittal images were generated and 3D post processing consisting of coronal  maximum intensity images was performed. CT dose reduction was achieved through  use of a standardized protocol tailored for this examination and automatic  exposure control for dose modulation. FINDINGS:  The lungs are clear of mass, nodule, airspace disease or edema. There are emboli extending into the right lower lobe with associated moderate  peripheral airspace disease. A few peripheral emboli are noted in the inferior  peripheral branches of the left lower lobe with associated minimal peripheral  airspace disease. There is no mediastinal or hilar adenopathy or mass.  The aorta  enhances normally without evidence of aneurysm or dissection. The visualized portions of the upper abdominal organs are normal.      Impression IMPRESSION:     Positive bilateral lower lobe pulmonary emboli with presumed peripheral  infarctions as described. [place] more significant on the right. This result was verbally relayed to Laci Ramirez at 1349 hours by myself. 789             Lab Results  reviewed  Lab Results   Component Value Date/Time    WBC 9.8 07/03/2017 10:24 AM    HGB 12.5 07/03/2017 10:24 AM    HCT 39.0 07/03/2017 10:24 AM    PLATELET 017 96/75/2999 10:24 AM    MCV 71.8 07/03/2017 10:24 AM       Lab Results   Component Value Date/Time    Sodium 135 07/02/2017 11:51 AM    Potassium 3.7 07/02/2017 11:51 AM    Chloride 101 07/02/2017 11:51 AM    CO2 29 07/02/2017 11:51 AM    Anion gap 5 07/02/2017 11:51 AM    Glucose 92 07/02/2017 11:51 AM    BUN 5 07/02/2017 11:51 AM    Creatinine 0.87 07/02/2017 11:51 AM    BUN/Creatinine ratio 6 07/02/2017 11:51 AM    GFR est AA >60 07/02/2017 11:51 AM    GFR est non-AA >60 07/02/2017 11:51 AM    Calcium 9.0 07/02/2017 11:51 AM    AST (SGOT) 8 07/02/2017 11:51 AM    Alk. phosphatase 70 07/02/2017 11:51 AM    Protein, total 8.1 07/02/2017 11:51 AM    Albumin 3.1 07/02/2017 11:51 AM    Globulin 5.0 07/02/2017 11:51 AM    A-G Ratio 0.6 07/02/2017 11:51 AM    ALT (SGPT) 22 07/02/2017 11:51 AM       .    Assessment/Plan:    ASSESSMENT  Mr. Oneil Guthrie is a [address] [place] y.o. male seen today for office f/u of pulmonary emboli/ seen by Dr Daniel Corley in hospital consult.      DISCUSSION/PLAN    1. Pulmonary embolism unprovoked on eliquis. This was an unprovoked venous thromboembolism. Per the 2016 ACCP guidelines for Antithrombotic Therapy for VTE Disease, this should be treated with long term anticoagulation as long as the benefits of anticoagulation outweigh the risks. We discussed these guidelines in detail and the fact that he could be at risk for recurrent blood clots on cessation of anticoagulation.     Pt continues on eliquis and tolerating this well. Continue eliquis. Pt will f/u with PCP in [place]. We will send records once pt gives us a f/u name/ practice.      2. Hypercoagulable state. Workup negative by ER. needs to do prothrombin mutation. Ordered.      3. Choice of anticoagulant. on eliquis and tolerating fine. Prior notes for record: He requires systemic anticoagulation. Options include coumadin, low molecular weight heparin, or novel anticoagulants such as Eliquis. We discussed that trials have showed that this this agent is non-inferior to coumadin with a favorable toxicity profile. We discussed that there is no reversal agent for Eliquis, but in trials it has less bleeding. We also discussed the current class action suites suggesting that bleeding was underrepresented in trials for the novel oral anticoagulants.     4. Pulmonary infarction. symptoms improving. 5.  Chest wall pain due to above. Percocet prn. Not really using anymore. No refills needed. 6.  Needs PCP/ lives here and [place]. Also [facility] to [facility]. will only f/u with us prn. / police force. Mom here today. F/u here as needed  Call if questions. ICD-10-CM ICD-9-CM    1. Other acute pulmonary embolism without acute cor pulmonale (HCC) I26.99 415.19 FACTOR II  DNA ANALYSIS   2. SOB (shortness of breath) R06.02 786.05 FACTOR II  DNA ANALYSIS   3. Chest wall pain R07.89 786.52 FACTOR II  DNA ANALYSIS       Current Outpatient Prescriptions   Medication Sig    apixaban (ELIQUIS) 5 mg tablet Take 1 Tab by mouth two (2) times a day.  apixaban (ELIQUIS) 5 mg tablet Take 5 mg by mouth.  oxyCODONE-acetaminophen (PERCOCET 10)  mg per tablet TK 1 T PO Q 4 H PRN. MAX DAILY AMOUNT IS 6 TABLETS.  aspirin 81 mg chewable tablet Take 81 mg by mouth.  naproxen (NAPROSYN) 500 mg tablet Take 500 mg by mouth.  predniSONE (DELTASONE) 20 mg tablet Take 20 mg by mouth.     oxyCODONE-acetaminophen (PERCOCET) 5-325 mg per tablet Take 1 Tab by mouth two (2) times daily as needed for Pain. Max Daily Amount: 2 Tabs. Indications: Pain     No current facility-administered medications for this visit. There are no Patient Instructions on file for this visit. Follow-up Disposition:  Return if symptoms worsen or fail to improve.     Adrianne Acevedo,

## 2017-07-29 ENCOUNTER — TELEPHONE (OUTPATIENT)
Dept: ONCOLOGY | Age: 32
End: 2017-07-29

## 2017-07-29 NOTE — TELEPHONE ENCOUNTER
This pt needs navigator support  Young man post PE/   Needs to see PCP/ and [facility] system

## 2017-07-29 NOTE — LETTER
7/29/2017 8:49 AM 
 
Mr. Lashaun Hicks [address] [place] [address] To whom it may concern: Mr. Juhi Saunders is under the care of our practice for recent pulmonary embolism and pulmonary infarction. Since the blood clot he remains short of breath with minimal exertion and is limited to the point where it is difficult for him to tie his shoes. Given his current limitations, in my view, he is currently unable to travel for and perform [facility] duty.  
 
Sincerely, 
 
 
 
 
John Paul Simon MD

## 2017-07-29 NOTE — TELEPHONE ENCOUNTER
Hematology/Oncology On Call - Telephone Encounter    Called this morning regarding his work situation. He is in the [employer] and is currently supposed to be on duty. At this time, however, he can't make it to his duty. He remains short of breath. Hasn't been able to tie his shoes and in no way feels physically able go on [employer] duty for 2 weeks. I have written him a letter to this effect. Carmen Ragsdale MD

## 2017-07-31 ENCOUNTER — PATIENT OUTREACH (OUTPATIENT)
Dept: ONCOLOGY | Age: 32
End: 2017-07-31

## 2017-07-31 NOTE — PROGRESS NOTES
[facility]  Medical Oncology at [facility]   Nurse Navigator Note      Left message x 2 regarding support for PCP/VA connection.

## 2017-08-10 ENCOUNTER — TELEPHONE (OUTPATIENT)
Dept: ONCOLOGY | Age: 32
End: 2017-08-10

## 2017-08-10 NOTE — TELEPHONE ENCOUNTER
HIPAA verified. Stated needs intermittent FMLA for mother who is helping him move in [address] as is short of breath with any activity. Has established care with PCP: Dr. Ford Chilel would forward to Dr. Thom Whittaker for review.

## 2017-08-10 NOTE — TELEPHONE ENCOUNTER
Recd FMLA from [employer] for patient. Need to clarify nature of FMLA. ML on patient contact # for return call.

## 2017-08-11 ENCOUNTER — DOCUMENTATION ONLY (OUTPATIENT)
Dept: ONCOLOGY | Age: 32
End: 2017-08-11

## 2017-08-11 NOTE — PROGRESS NOTES
[place] paperwork completed & signed by provider. Spoke with patient. HIPAA verified. States he will  from office. Will leave paperwork at . Copy to CHUCKY MILLER for scanning.

## 2018-03-18 ENCOUNTER — APPOINTMENT (OUTPATIENT)
Dept: GENERAL RADIOLOGY | Age: 33
End: 2018-03-18
Attending: EMERGENCY MEDICINE
Payer: OTHER GOVERNMENT

## 2018-03-18 ENCOUNTER — HOSPITAL ENCOUNTER (EMERGENCY)
Age: 33
Discharge: HOME | End: 2018-03-18
Attending: EMERGENCY MEDICINE
Payer: OTHER GOVERNMENT

## 2018-03-18 VITALS
TEMPERATURE: 98.7 F | HEART RATE: 75 BPM | WEIGHT: 238.2 LBS | OXYGEN SATURATION: 100 % | HEIGHT: 73 IN | BODY MASS INDEX: 31.57 KG/M2 | RESPIRATION RATE: 21 BRPM | DIASTOLIC BLOOD PRESSURE: 78 MMHG | SYSTOLIC BLOOD PRESSURE: 155 MMHG

## 2018-03-18 DIAGNOSIS — R00.2 PALPITATIONS: Primary | ICD-10-CM

## 2018-03-18 DIAGNOSIS — R79.89 LOW TSH LEVEL: ICD-10-CM

## 2018-03-18 LAB
ALBUMIN SERPL-MCNC: 3.9 G/DL (ref 3.5–5)
ALBUMIN/GLOB SERPL: 0.8 {RATIO} (ref 1.1–2.2)
ALP SERPL-CCNC: 54 U/L (ref 45–117)
ALT SERPL-CCNC: 48 U/L (ref 12–78)
ANION GAP SERPL CALC-SCNC: 6 MMOL/L (ref 5–15)
AST SERPL-CCNC: 23 U/L (ref 15–37)
BASOPHILS # BLD: 0 K/UL (ref 0–0.1)
BASOPHILS NFR BLD: 1 % (ref 0–1)
BILIRUB SERPL-MCNC: 0.3 MG/DL (ref 0.2–1)
BUN SERPL-MCNC: 13 MG/DL (ref 6–20)
BUN/CREAT SERPL: 11 (ref 12–20)
CALCIUM SERPL-MCNC: 8.6 MG/DL (ref 8.5–10.1)
CHLORIDE SERPL-SCNC: 104 MMOL/L (ref 97–108)
CK MB CFR SERPL CALC: 0.2 % (ref 0–2.5)
CK MB CFR SERPL CALC: 0.3 % (ref 0–2.5)
CK MB SERPL-MCNC: 1.4 NG/ML (ref 5–25)
CK MB SERPL-MCNC: 1.7 NG/ML (ref 5–25)
CK SERPL-CCNC: 562 U/L (ref 39–308)
CK SERPL-CCNC: 618 U/L (ref 39–308)
CO2 SERPL-SCNC: 31 MMOL/L (ref 21–32)
CREAT SERPL-MCNC: 1.18 MG/DL (ref 0.7–1.3)
D DIMER PPP FEU-MCNC: 0.41 MG/L FEU (ref 0–0.65)
DIFFERENTIAL METHOD BLD: ABNORMAL
EOSINOPHIL # BLD: 0.2 K/UL (ref 0–0.4)
EOSINOPHIL NFR BLD: 3 % (ref 0–7)
ERYTHROCYTE [DISTWIDTH] IN BLOOD BY AUTOMATED COUNT: 15.9 % (ref 11.5–14.5)
GLOBULIN SER CALC-MCNC: 4.7 G/DL (ref 2–4)
GLUCOSE SERPL-MCNC: 81 MG/DL (ref 65–100)
HCT VFR BLD AUTO: 42.9 % (ref 36.6–50.3)
HGB BLD-MCNC: 13 G/DL (ref 12.1–17)
IMM GRANULOCYTES # BLD: 0 K/UL (ref 0–0.04)
IMM GRANULOCYTES NFR BLD AUTO: 0 % (ref 0–0.5)
LYMPHOCYTES # BLD: 1.8 K/UL (ref 0.8–3.5)
LYMPHOCYTES NFR BLD: 31 % (ref 12–49)
MAGNESIUM SERPL-MCNC: 1.8 MG/DL (ref 1.6–2.4)
MCH RBC QN AUTO: 23 PG (ref 26–34)
MCHC RBC AUTO-ENTMCNC: 30.3 G/DL (ref 30–36.5)
MCV RBC AUTO: 75.8 FL (ref 80–99)
MONOCYTES # BLD: 0.8 K/UL (ref 0–1)
MONOCYTES NFR BLD: 13 % (ref 5–13)
NEUTS SEG # BLD: 3.1 K/UL (ref 1.8–8)
NEUTS SEG NFR BLD: 52 % (ref 32–75)
NRBC # BLD: 0 K/UL (ref 0–0.01)
NRBC BLD-RTO: 0 PER 100 WBC
PLATELET # BLD AUTO: 293 K/UL (ref 150–400)
PMV BLD AUTO: 11.3 FL (ref 8.9–12.9)
POTASSIUM SERPL-SCNC: 3.7 MMOL/L (ref 3.5–5.1)
PROT SERPL-MCNC: 8.6 G/DL (ref 6.4–8.2)
RBC # BLD AUTO: 5.66 M/UL (ref 4.1–5.7)
SODIUM SERPL-SCNC: 141 MMOL/L (ref 136–145)
TROPONIN I SERPL-MCNC: <0.04 NG/ML
TROPONIN I SERPL-MCNC: <0.04 NG/ML
TSH SERPL DL<=0.05 MIU/L-ACNC: 0.27 UIU/ML (ref 0.36–3.74)
WBC # BLD AUTO: 6 K/UL (ref 4.1–11.1)

## 2018-03-18 PROCEDURE — 96360 HYDRATION IV INFUSION INIT: CPT

## 2018-03-18 PROCEDURE — 85025 COMPLETE CBC W/AUTO DIFF WBC: CPT | Performed by: EMERGENCY MEDICINE

## 2018-03-18 PROCEDURE — 82550 ASSAY OF CK (CPK): CPT | Performed by: EMERGENCY MEDICINE

## 2018-03-18 PROCEDURE — 85379 FIBRIN DEGRADATION QUANT: CPT | Performed by: EMERGENCY MEDICINE

## 2018-03-18 PROCEDURE — 84443 ASSAY THYROID STIM HORMONE: CPT | Performed by: EMERGENCY MEDICINE

## 2018-03-18 PROCEDURE — 36415 COLL VENOUS BLD VENIPUNCTURE: CPT | Performed by: EMERGENCY MEDICINE

## 2018-03-18 PROCEDURE — 71046 X-RAY EXAM CHEST 2 VIEWS: CPT

## 2018-03-18 PROCEDURE — 93005 ELECTROCARDIOGRAM TRACING: CPT

## 2018-03-18 PROCEDURE — 83735 ASSAY OF MAGNESIUM: CPT | Performed by: EMERGENCY MEDICINE

## 2018-03-18 PROCEDURE — 74011250636 HC RX REV CODE- 250/636: Performed by: EMERGENCY MEDICINE

## 2018-03-18 PROCEDURE — 99284 EMERGENCY DEPT VISIT MOD MDM: CPT

## 2018-03-18 PROCEDURE — 80053 COMPREHEN METABOLIC PANEL: CPT | Performed by: EMERGENCY MEDICINE

## 2018-03-18 PROCEDURE — 84484 ASSAY OF TROPONIN QUANT: CPT | Performed by: EMERGENCY MEDICINE

## 2018-03-18 RX ADMIN — SODIUM CHLORIDE 1000 ML: 900 INJECTION, SOLUTION INTRAVENOUS at 20:34

## 2018-03-18 NOTE — ED TRIAGE NOTES
Triage note: Pt states \"my heart feels weird since around 1000\". Nothing makes the feeling better or worse.

## 2018-03-19 LAB
ATRIAL RATE: 68 BPM
CALCULATED P AXIS, ECG09: 35 DEGREES
CALCULATED R AXIS, ECG10: 106 DEGREES
CALCULATED T AXIS, ECG11: -15 DEGREES
DIAGNOSIS, 93000: NORMAL
P-R INTERVAL, ECG05: 132 MS
Q-T INTERVAL, ECG07: 368 MS
QRS DURATION, ECG06: 82 MS
QTC CALCULATION (BEZET), ECG08: 391 MS
VENTRICULAR RATE, ECG03: 68 BPM

## 2018-03-19 NOTE — ED NOTES
Patient has been medically cleared for discharge at this time. All questions answered at this time. Patient and family seen leaving the department with a steady gait and all belongings.

## 2018-03-19 NOTE — ED NOTES
Reassessed patient at this time. He is tolerating well. No complaints at this time. He remains in the bed, CM x 3. Call bell within reach of patient.

## 2018-03-19 NOTE — DISCHARGE INSTRUCTIONS
Palpitations: Care Instructions  Your Care Instructions    Heart palpitations are the uncomfortable sensation that your heart is beating fast or irregularly. You might feel pounding or fluttering in your chest. It might feel like your heart is skipping a beat. Although palpitations may be caused by a heart problem, they also occur because of stress, fatigue, or use of alcohol, caffeine, or nicotine. Many medicines, including diet pills, antihistamines, decongestants, and some herbal products, can cause heart palpitations. Nearly everyone has palpitations from time to time. Depending on your symptoms, your doctor may need to do more tests to try to find the cause of your palpitations. Follow-up care is a key part of your treatment and safety. Be sure to make and go to all appointments, and call your doctor if you are having problems. It's also a good idea to know your test results and keep a list of the medicines you take. How can you care for yourself at home? · Avoid caffeine, nicotine, and excess alcohol. · Do not take illegal drugs, such as methamphetamines and cocaine. · Do not take weight loss or diet medicines unless you talk with your doctor first.  · Get plenty of sleep. · Do not overeat. · If you have palpitations again, take deep breaths and try to relax. This may slow a racing heart. · If you start to feel lightheaded, lie down to avoid injuries that might result if you pass out and fall down. · Keep a record of your palpitations and bring it to your next doctor's appointment. Write down:  ¨ The date and time. ¨ Your pulse. (If your heart is beating fast, it may be hard to count your pulse.)  ¨ What you were doing when the palpitations started. ¨ How long the palpitations lasted. ¨ Any other symptoms. · If an activity causes palpitations, slow down or stop. Talk to your doctor before you do that activity again. · Take your medicines exactly as prescribed.  Call your doctor if you think you are having a problem with your medicine. When should you call for help? Call 911 anytime you think you may need emergency care. For example, call if:  ? · You passed out (lost consciousness). ? · You have symptoms of a heart attack. These may include:  ¨ Chest pain or pressure, or a strange feeling in the chest.  ¨ Sweating. ¨ Shortness of breath. ¨ Pain, pressure, or a strange feeling in the back, neck, jaw, or upper belly or in one or both shoulders or arms. ¨ Lightheadedness or sudden weakness. ¨ A fast or irregular heartbeat. After you call 911, the  may tell you to chew 1 adult-strength or 2 to 4 low-dose aspirin. Wait for an ambulance. Do not try to drive yourself. ? · You have symptoms of a stroke. These may include:  ¨ Sudden numbness, tingling, weakness, or loss of movement in your face, arm, or leg, especially on only one side of your body. ¨ Sudden vision changes. ¨ Sudden trouble speaking. ¨ Sudden confusion or trouble understanding simple statements. ¨ Sudden problems with walking or balance. ¨ A sudden, severe headache that is different from past headaches. ?Call your doctor now or seek immediate medical care if:  ? · You have heart palpitations and:  ¨ Are dizzy or lightheaded, or you feel like you may faint. ¨ Have new or increased shortness of breath. ? Watch closely for changes in your health, and be sure to contact your doctor if:  ? · You continue to have heart palpitations. Where can you learn more? Go to http://negar-jovana.info/. Enter R508 in the search box to learn more about \"Palpitations: Care Instructions. \"  Current as of: September 21, 2016  Content Version: 11.4  © 1861-8424 [employer]. Care instructions adapted under license by [employer] (which disclaims liability or warranty for this information).  If you have questions about a medical condition or this instruction, always ask your healthcare professional. [address] any warranty or liability for your use of this information. We hope that we have addressed all of your medical concerns. The examination and treatment you received in the Emergency Department were for an emergent problem and were not intended as complete care. It is important that you follow up with your healthcare provider(s) for ongoing care. If your symptoms worsen or do not improve as expected, and you are unable to reach your usual health care provider(s), you should return to the Emergency Department. Today's healthcare is undergoing tremendous change, and patient satisfaction surveys are one of the many tools to assess the quality of medical care. You may receive a survey from the [employer] regarding your experience in the Emergency Department. I hope that your experience has been completely positive, particularly the medical care that I provided. As such, please participate in the survey; anything less than excellent does not meet my expectations or intentions. Formerly [facility], later [facility] and [address] [place] [address] participate in nationally recognized quality of care measures. If your blood pressure is greater than 120/80, as reported below, we urge that you seek medical care to address the potential of high blood pressure, commonly known as hypertension. Hypertension can be hereditary or can be caused by certain medical conditions, pain, stress, or \"white coat syndrome. \"       Please make an appointment with your health care provider(s) for follow up of your Emergency Department visit. VITALS:   Patient Vitals for the past 8 hrs:   Temp Pulse Resp BP SpO2   03/18/18 2130 - 66 21 169/78 100 %   03/18/18 2045 - 63 20 172/88 100 %   03/18/18 2031 - - - 168/88 -   03/18/18 1839 98.5 °F (36.9 °C) (!) 58 16 (!) 156/92 99 %          Thank you for allowing us to provide you with medical care today.   We realize that you have many choices for your emergency care needs. Please choose us in the future for any continued health care needs. Cesilia Barry [place], [address].   Office: [phone]            Recent Results (from the past 24 hour(s))   EKG, 12 LEAD, INITIAL    Collection Time: 03/18/18  6:54 PM   Result Value Ref Range    Ventricular Rate 68 BPM    Atrial Rate 68 BPM    P-R Interval 132 ms    QRS Duration 82 ms    Q-T Interval 368 ms    QTC Calculation (Bezet) 391 ms    Calculated P Axis 35 degrees    Calculated R Axis 106 degrees    Calculated T Axis -15 degrees    Diagnosis       Normal sinus rhythm  T wave abnormality, consider inferior ischemia  When compared with ECG of 30-JUN-2017 14:45,  QRS axis shifted right  Inverted T waves have replaced nonspecific T wave abnormality in Inferior   leads     CBC WITH AUTOMATED DIFF    Collection Time: 03/18/18  6:59 PM   Result Value Ref Range    WBC 6.0 4.1 - 11.1 K/uL    RBC 5.66 4.10 - 5.70 M/uL    HGB 13.0 12.1 - 17.0 g/dL    HCT 42.9 36.6 - 50.3 %    MCV 75.8 (L) 80.0 - 99.0 FL    MCH 23.0 (L) 26.0 - 34.0 PG    MCHC 30.3 30.0 - 36.5 g/dL    RDW 15.9 (H) 11.5 - 14.5 %    PLATELET 240 454 - 659 K/uL    MPV 11.3 8.9 - 12.9 FL    NRBC 0.0 0  WBC    ABSOLUTE NRBC 0.00 0.00 - 0.01 K/uL    NEUTROPHILS 52 32 - 75 %    LYMPHOCYTES 31 12 - 49 %    MONOCYTES 13 5 - 13 %    EOSINOPHILS 3 0 - 7 %    BASOPHILS 1 0 - 1 %    IMMATURE GRANULOCYTES 0 0.0 - 0.5 %    ABS. NEUTROPHILS 3.1 1.8 - 8.0 K/UL    ABS. LYMPHOCYTES 1.8 0.8 - 3.5 K/UL    ABS. MONOCYTES 0.8 0.0 - 1.0 K/UL    ABS. EOSINOPHILS 0.2 0.0 - 0.4 K/UL    ABS. BASOPHILS 0.0 0.0 - 0.1 K/UL    ABS. IMM.  GRANS. 0.0 0.00 - 0.04 K/UL    DF AUTOMATED     METABOLIC PANEL, COMPREHENSIVE    Collection Time: 03/18/18  6:59 PM   Result Value Ref Range    Sodium 141 136 - 145 mmol/L    Potassium 3.7 3.5 - 5.1 mmol/L    Chloride 104 97 - 108 mmol/L    CO2 31 21 - 32 mmol/L Anion gap 6 5 - 15 mmol/L    Glucose 81 65 - 100 mg/dL    BUN 13 6 - 20 MG/DL    Creatinine 1.18 0.70 - 1.30 MG/DL    BUN/Creatinine ratio 11 (L) 12 - 20      GFR est AA >60 >60 ml/min/1.73m2    GFR est non-AA >60 >60 ml/min/1.73m2    Calcium 8.6 8.5 - 10.1 MG/DL    Bilirubin, total 0.3 0.2 - 1.0 MG/DL    ALT (SGPT) 48 12 - 78 U/L    AST (SGOT) 23 15 - 37 U/L    Alk. phosphatase 54 45 - 117 U/L    Protein, total 8.6 (H) 6.4 - 8.2 g/dL    Albumin 3.9 3.5 - 5.0 g/dL    Globulin 4.7 (H) 2.0 - 4.0 g/dL    A-G Ratio 0.8 (L) 1.1 - 2.2     CK W/ CKMB & INDEX    Collection Time: 03/18/18  6:59 PM   Result Value Ref Range     (H) 39 - 308 U/L    CK - MB 1.7 <3.6 NG/ML    CK-MB Index 0.3 0 - 2.5     D DIMER    Collection Time: 03/18/18  6:59 PM   Result Value Ref Range    D-dimer 0.41 0.00 - 0.65 mg/L FEU   TROPONIN I    Collection Time: 03/18/18  6:59 PM   Result Value Ref Range    Troponin-I, Qt. <0.04 <0.05 ng/mL   MAGNESIUM    Collection Time: 03/18/18  6:59 PM   Result Value Ref Range    Magnesium 1.8 1.6 - 2.4 mg/dL   TSH 3RD GENERATION    Collection Time: 03/18/18  6:59 PM   Result Value Ref Range    TSH 0.27 (L) 0.36 - 3.74 uIU/mL   CK W/ CKMB & INDEX    Collection Time: 03/18/18  9:26 PM   Result Value Ref Range     (H) 39 - 308 U/L    CK - MB 1.4 <3.6 NG/ML    CK-MB Index 0.2 0 - 2.5     TROPONIN I    Collection Time: 03/18/18  9:26 PM   Result Value Ref Range    Troponin-I, Qt. <0.04 <0.05 ng/mL       Xr Chest Pa Lat    Result Date: 3/18/2018  INDICATION: . Chest Pain Additional history: Palpitations since 1000 hours. COMPARISON: Previous chest xray, 7/3/2017. Maria Guadalupe Collins FINDINGS: PA and lateral view of the chest. . Lines/tubes/surgical: None. Heart/mediastinum: Unremarkable. Lungs/pleura:  No focal consolidation or mass. No visualized pleural effusion or pneumothorax. Additional Comments: None. Maria Guadalupe Collins IMPRESSION: 1. No radiographic evidence of acute cardiopulmonary disease.

## 2018-03-19 NOTE — ED NOTES
Reassessed at this time. Patient continues to be stable at this time. No complaints. CM x 3. Call bell within reach of patient at this time.

## 2018-03-19 NOTE — ED NOTES
Assumed care of patient at this time. Patient is alert and oriented. States that he has been having an \"odd sensation\" in his heart, \"like I can feel it beating. \" Denies pain, pressure and discomfort at this time. CM x 3. Call bell within reach of patient at this time.

## 2018-03-19 NOTE — ED PROVIDER NOTES
HPI Comments: 29 y.o. male with past medical history significant for PE and infarction who presents accompanied by his mother with chief complaint of palpitations. The pt c/o heart palpitations that have been constant for weeks. The pt indicates that his palpitations are worse today. The pt admits that his palpitations are present when he tires to sleep. The pt states that nothing makes his palpitations better or worse. The pt reports being on Eliquis due to his hx of PE. The pt denies developing palpitations when he was diagnosed with a PE. The pt had a scheduled follow up CT scan 10 days ago to reevaluated his PE's. The pt denies CP, SOB, cough, fever, leg pain, leg swelling, vomiting, diarrhea, new medications, abdominal pain, change in appetite, and hx of HTN. There are no other acute medical concerns at this time. Old Chart Review: Viewed the report of the pt's CT scan from 8 days ago. The scan revealed an enlarged heart without a pericardial effusion. No PE's present. Unchanged pulmonary nodules. atelectasis of both lungs noted. Social hx: nonsmoker    Note written by Gurney Carrel, Scribe, as dictated by Jasmin Cavanaugh MD 7:30 PM      The history is provided by the patient. No  was used. Past Medical History:   Diagnosis Date    Pulmonary embolism and infarction [facility])        History reviewed. No pertinent surgical history. Family History:   Problem Relation Age of Onset    Hypertension Mother        Social History     Social History    Marital status: SINGLE     Spouse name: N/A    Number of children: N/A    Years of education: N/A     Occupational History    Not on file.      Social History Main Topics    Smoking status: Never Smoker    Smokeless tobacco: Never Used    Alcohol use No    Drug use: No    Sexual activity: Yes     Partners: Female     Birth control/ protection: None     Other Topics Concern    Not on file     Social History Narrative    ** Merged History Encounter **              ALLERGIES: Review of patient's allergies indicates no known allergies. Review of Systems   Constitutional: Negative for appetite change and fever. Respiratory: Negative for cough and shortness of breath. Cardiovascular: Positive for palpitations. Negative for chest pain and leg swelling. Gastrointestinal: Negative for abdominal pain, diarrhea and vomiting. Musculoskeletal: Negative for myalgias. All other systems reviewed and are negative. Vitals:    03/18/18 1839   BP: (!) 156/92   Pulse: (!) 58   Resp: 16   Temp: 98.5 °F (36.9 °C)   SpO2: 99%   Weight: 108 kg (238 lb 3.2 oz)   Height: 6' 1\" (1.854 m)            Physical Exam   Constitutional: He is oriented to person, place, and time. He appears well-developed and well-nourished. No distress. HENT:   Head: Normocephalic and atraumatic. Mouth/Throat: Oropharynx is clear and moist.   Eyes: Pupils are equal, round, and reactive to light. Neck: Normal range of motion. Neck supple. Cardiovascular: Normal rate, regular rhythm, normal heart sounds and intact distal pulses. Pulmonary/Chest: Effort normal and breath sounds normal. No respiratory distress. Abdominal: Soft. Bowel sounds are normal. He exhibits no distension. There is no tenderness. Musculoskeletal: Normal range of motion. He exhibits no edema. Neurological: He is alert and oriented to person, place, and time. Skin: Skin is warm and dry. Nursing note and vitals reviewed.    Note written by Kim Hernandez, as dictated by Roseann Romero MD 7:30 PM     MDM  Number of Diagnoses or Management Options  Low TSH level:   Palpitations:      Amount and/or Complexity of Data Reviewed  Clinical lab tests: ordered and reviewed  Tests in the radiology section of CPT®: ordered and reviewed  Decide to obtain previous medical records or to obtain history from someone other than the patient: yes  Obtain history from someone other than the patient: yes (family)  Review and summarize past medical records: yes  Independent visualization of images, tracings, or specimens: yes    Patient Progress  Patient progress: improved        ED Course       Procedures    ED EKG interpretation:  Rhythm: normal sinus rhythm; and regular . Rate (approx.): 68; Axis: normal; P wave: normal; QRS interval: normal ; ST/T wave: non-specific changes; t wave inversion III, aVF, lateral leads, no significant changes from 6/30/2017  EKG documented by Drew Lal MD    Pt feeling better, VSS. Discussed test results with pt including low TSH. Given copy of test results for f/u with pcp and cardiology.

## 2018-07-30 ENCOUNTER — HOSPITAL ENCOUNTER (EMERGENCY)
Age: 33
Discharge: HOME | End: 2018-07-30
Attending: EMERGENCY MEDICINE | Admitting: EMERGENCY MEDICINE
Payer: COMMERCIAL

## 2018-07-30 VITALS
HEIGHT: 73 IN | WEIGHT: 242 LBS | DIASTOLIC BLOOD PRESSURE: 83 MMHG | BODY MASS INDEX: 32.07 KG/M2 | RESPIRATION RATE: 16 BRPM | SYSTOLIC BLOOD PRESSURE: 135 MMHG | OXYGEN SATURATION: 100 % | TEMPERATURE: 98.1 F | HEART RATE: 63 BPM

## 2018-07-30 DIAGNOSIS — R74.8 ELEVATED CK: ICD-10-CM

## 2018-07-30 DIAGNOSIS — M79.10 MYALGIA: Primary | ICD-10-CM

## 2018-07-30 LAB
ALBUMIN SERPL-MCNC: 3.9 G/DL (ref 3.5–5)
ALBUMIN/GLOB SERPL: 0.8 {RATIO} (ref 1.1–2.2)
ALP SERPL-CCNC: 69 U/L (ref 45–117)
ALT SERPL-CCNC: 55 U/L (ref 12–78)
ANION GAP SERPL CALC-SCNC: 7 MMOL/L (ref 5–15)
APPEARANCE UR: CLEAR
AST SERPL-CCNC: 38 U/L (ref 15–37)
BACTERIA URNS QL MICRO: ABNORMAL /HPF
BASOPHILS # BLD: 0 K/UL (ref 0–0.1)
BASOPHILS NFR BLD: 0 % (ref 0–1)
BILIRUB SERPL-MCNC: 0.4 MG/DL (ref 0.2–1)
BILIRUB UR QL: NEGATIVE
BUN SERPL-MCNC: 11 MG/DL (ref 6–20)
BUN/CREAT SERPL: 11 (ref 12–20)
CALCIUM SERPL-MCNC: 8.8 MG/DL (ref 8.5–10.1)
CHLORIDE SERPL-SCNC: 104 MMOL/L (ref 97–108)
CK MB CFR SERPL CALC: 0.3 % (ref 0–2.5)
CK MB SERPL-MCNC: 2.9 NG/ML (ref 5–25)
CK SERPL-CCNC: 852 U/L (ref 39–308)
CO2 SERPL-SCNC: 28 MMOL/L (ref 21–32)
COLOR UR: ABNORMAL
CREAT SERPL-MCNC: 1.04 MG/DL (ref 0.7–1.3)
DIFFERENTIAL METHOD BLD: ABNORMAL
EOSINOPHIL # BLD: 0.1 K/UL (ref 0–0.4)
EOSINOPHIL NFR BLD: 2 % (ref 0–7)
EPITH CASTS URNS QL MICRO: ABNORMAL /LPF
ERYTHROCYTE [DISTWIDTH] IN BLOOD BY AUTOMATED COUNT: 15.2 % (ref 11.5–14.5)
GLOBULIN SER CALC-MCNC: 5.1 G/DL (ref 2–4)
GLUCOSE SERPL-MCNC: 79 MG/DL (ref 65–100)
GLUCOSE UR STRIP.AUTO-MCNC: NEGATIVE MG/DL
HCT VFR BLD AUTO: 43.1 % (ref 36.6–50.3)
HETEROPH AB SER QL: NEGATIVE
HGB BLD-MCNC: 13.2 G/DL (ref 12.1–17)
HGB UR QL STRIP: NEGATIVE
IMM GRANULOCYTES # BLD: 0 K/UL (ref 0–0.04)
IMM GRANULOCYTES NFR BLD AUTO: 0 % (ref 0–0.5)
KETONES UR QL STRIP.AUTO: ABNORMAL MG/DL
LEUKOCYTE ESTERASE UR QL STRIP.AUTO: NEGATIVE
LYMPHOCYTES # BLD: 1.6 K/UL (ref 0.8–3.5)
LYMPHOCYTES NFR BLD: 30 % (ref 12–49)
MCH RBC QN AUTO: 23.2 PG (ref 26–34)
MCHC RBC AUTO-ENTMCNC: 30.6 G/DL (ref 30–36.5)
MCV RBC AUTO: 75.6 FL (ref 80–99)
MONOCYTES # BLD: 0.6 K/UL (ref 0–1)
MONOCYTES NFR BLD: 11 % (ref 5–13)
MUCOUS THREADS URNS QL MICRO: ABNORMAL /LPF
NEUTS SEG # BLD: 2.9 K/UL (ref 1.8–8)
NEUTS SEG NFR BLD: 56 % (ref 32–75)
NITRITE UR QL STRIP.AUTO: NEGATIVE
NRBC # BLD: 0 K/UL (ref 0–0.01)
NRBC BLD-RTO: 0 PER 100 WBC
PH UR STRIP: 6.5 [PH] (ref 5–8)
PLATELET # BLD AUTO: 246 K/UL (ref 150–400)
PMV BLD AUTO: 10.6 FL (ref 8.9–12.9)
POTASSIUM SERPL-SCNC: 3.8 MMOL/L (ref 3.5–5.1)
PROT SERPL-MCNC: 9 G/DL (ref 6.4–8.2)
PROT UR STRIP-MCNC: 30 MG/DL
RBC # BLD AUTO: 5.7 M/UL (ref 4.1–5.7)
RBC #/AREA URNS HPF: ABNORMAL /HPF (ref 0–5)
S PYO AG THROAT QL: NEGATIVE
SODIUM SERPL-SCNC: 139 MMOL/L (ref 136–145)
SP GR UR REFRACTOMETRY: 1.01 (ref 1–1.03)
UR CULT HOLD, URHOLD: NORMAL
UROBILINOGEN UR QL STRIP.AUTO: 1 EU/DL (ref 0.2–1)
WBC # BLD AUTO: 5.2 K/UL (ref 4.1–11.1)
WBC URNS QL MICRO: ABNORMAL /HPF (ref 0–4)

## 2018-07-30 PROCEDURE — 82550 ASSAY OF CK (CPK): CPT | Performed by: PHYSICIAN ASSISTANT

## 2018-07-30 PROCEDURE — 87880 STREP A ASSAY W/OPTIC: CPT

## 2018-07-30 PROCEDURE — 36415 COLL VENOUS BLD VENIPUNCTURE: CPT | Performed by: PHYSICIAN ASSISTANT

## 2018-07-30 PROCEDURE — 86308 HETEROPHILE ANTIBODY SCREEN: CPT | Performed by: PHYSICIAN ASSISTANT

## 2018-07-30 PROCEDURE — 85025 COMPLETE CBC W/AUTO DIFF WBC: CPT | Performed by: PHYSICIAN ASSISTANT

## 2018-07-30 PROCEDURE — 87070 CULTURE OTHR SPECIMN AEROBIC: CPT | Performed by: PHYSICIAN ASSISTANT

## 2018-07-30 PROCEDURE — 96360 HYDRATION IV INFUSION INIT: CPT

## 2018-07-30 PROCEDURE — 99283 EMERGENCY DEPT VISIT LOW MDM: CPT

## 2018-07-30 PROCEDURE — 81001 URINALYSIS AUTO W/SCOPE: CPT | Performed by: PHYSICIAN ASSISTANT

## 2018-07-30 PROCEDURE — 80053 COMPREHEN METABOLIC PANEL: CPT | Performed by: PHYSICIAN ASSISTANT

## 2018-07-30 PROCEDURE — 96361 HYDRATE IV INFUSION ADD-ON: CPT

## 2018-07-30 PROCEDURE — 74011250636 HC RX REV CODE- 250/636: Performed by: PHYSICIAN ASSISTANT

## 2018-07-30 RX ADMIN — SODIUM CHLORIDE 1000 ML: 900 INJECTION, SOLUTION INTRAVENOUS at 19:44

## 2018-07-30 RX ADMIN — SODIUM CHLORIDE 1000 ML: 900 INJECTION, SOLUTION INTRAVENOUS at 20:53

## 2018-07-30 NOTE — ED TRIAGE NOTES
Triage:  Pt to ED due to concerns over developing sore throat, body aches, and fatigue. Pt states his brother was recently admitted to [address] for a \"virus\" exhibiting the same complaints. Pt into triage with steady gait, no visible cues of distress are present.

## 2018-07-30 NOTE — ED PROVIDER NOTES
HPI Comments: 35 yo M with hx of PE on Eliquis here for evaluation of muscle aches. States he began with fatigue one week ago. States he feels generally weak and muscles feel weak. Noticed urine becoming dark 2 days ago. States he was with his brother last week and currently brother is in the ICU for Rhabdomyolysis. Also with sore throat and fever of 103 3-4 days ago which resolved on its own. Denies cough, CP, SOB, abd pain. Non smoker. Patient is a 34 y.o. male presenting with general illness, sore throat, and fatigue. The history is provided by the patient. Generalized Body Aches This is a new problem. The current episode started more than 2 days ago. The problem occurs constantly. The problem has been gradually worsening. Pertinent negatives include no chest pain, no abdominal pain, no headaches and no shortness of breath. Nothing aggravates the symptoms. Nothing relieves the symptoms. He has tried nothing for the symptoms. Sore Throat Pertinent negatives include no vomiting, no ear discharge, no headaches, no shortness of breath, no trouble swallowing and no cough. Fatigue Pertinent negatives include no shortness of breath, no chest pain, no vomiting, no confusion and no headaches. Past Medical History:  
Diagnosis Date  Pulmonary embolism and infarction ([facility] [address] History reviewed. No pertinent surgical history. Family History:  
Problem Relation Age of Onset  Hypertension Mother Social History Social History  Marital status: SINGLE Spouse name: N/A  
 Number of children: N/A  
 Years of education: N/A Occupational History  Not on file. Social History Main Topics  Smoking status: Never Smoker  Smokeless tobacco: Never Used  Alcohol use No  
 Drug use: No  
 Sexual activity: Yes  
  Partners: Female Birth control/ protection: None Other Topics Concern  Not on file Social History Narrative  ** Merged History Encounter ** ALLERGIES: Review of patient's allergies indicates no known allergies. Review of Systems Constitutional: Positive for fatigue. HENT: Positive for sore throat. Negative for ear discharge and trouble swallowing. Eyes: Negative for photophobia, pain, discharge and visual disturbance. Respiratory: Negative for apnea, cough, chest tightness and shortness of breath. Cardiovascular: Negative for chest pain, palpitations and leg swelling. Gastrointestinal: Negative for abdominal distention, abdominal pain, blood in stool and vomiting. Genitourinary: Negative for difficulty urinating, dysuria, flank pain and hematuria. Musculoskeletal: Positive for myalgias. Negative for back pain, gait problem, joint swelling and neck pain. Skin: Negative for color change and pallor. Neurological: Negative for dizziness, syncope, weakness, numbness and headaches. Psychiatric/Behavioral: Negative for behavioral problems and confusion. The patient is not nervous/anxious. Vitals:  
 07/30/18 1850 BP: 137/88 Pulse: 70 Resp: 18 Temp: 98.4 °F (36.9 °C) SpO2: 100% Weight: 109.8 kg (242 lb) Height: 6' 1\" (1.854 m) Physical Exam  
Constitutional: He is oriented to person, place, and time. He appears well-developed and well-nourished. HENT:  
Head: Normocephalic and atraumatic. Right Ear: External ear normal.  
Left Ear: External ear normal.  
Nose: Nose normal.  
Mouth/Throat: Oropharynx is clear and moist.  
Eyes: Conjunctivae and EOM are normal. Pupils are equal, round, and reactive to light. Right eye exhibits no discharge. Left eye exhibits no discharge. Neck: Normal range of motion. Neck supple. No meningeal signs Cardiovascular: Normal rate, regular rhythm, normal heart sounds and intact distal pulses. Pulmonary/Chest: Effort normal and breath sounds normal.  
Abdominal: Soft. Bowel sounds are normal. He exhibits no distension.  There is no tenderness. There is no rebound and no guarding. NO CVA tenderness Musculoskeletal: Normal range of motion. He exhibits no edema or tenderness. Neurological: He is alert and oriented to person, place, and time. He has normal strength. He is not disoriented. No cranial nerve deficit or sensory deficit. Coordination and gait normal. GCS eye subscore is 4. GCS verbal subscore is 5. GCS motor subscore is 6. Skin: Skin is warm and dry. No rash noted. Psychiatric: He has a normal mood and affect. His behavior is normal. Judgment and thought content normal.  
Nursing note and vitals reviewed. MDM Number of Diagnoses or Management Options Elevated CK: Myalgia:  
Diagnosis management comments: 33 yo male with fatigue and body aches; 's; BUN/Cr stable; fluids infused; symptoms improving; will have close followup; aware of s/s to return to ER; Dr Chikis Swift agrees with care/plan. TREY Silva Amount and/or Complexity of Data Reviewed Clinical lab tests: ordered and reviewed Discuss the patient with other providers: yes ED Course Procedures Patient has been reassessed. Feeling better. Second L saline infusing. Discussed case with attending Physician Chikis Swift. Agrees with care and will D/C with follow up. Patient has been reassessed. Feeling better. Reviewed labs, medications with patient. Ready to discharge home. Patient's results have been reviewed with them. Patient and/or family have verbally conveyed their understanding and agreement of the patient's signs, symptoms, diagnosis, treatment and prognosis and additionally agree to follow up as recommended or return to the Emergency Room should their condition change prior to follow-up.   Discharge instructions have also been provided to the patient with some educational information regarding their diagnosis as well a list of reasons why they would want to return to the ER prior to their follow-up appointment should their condition change.  
TREY Smith

## 2018-07-31 NOTE — DISCHARGE INSTRUCTIONS
Muscle Aches: Care Instructions  Your Care Instructions    Muscle aches have many possible causes. Some common ones are overuse, tension, and injuries such as a strained muscle. An infection such as the flu can cause muscle aches. Or the aches may be caused by some medicines, such as antipsychotics. Muscle aches may also be a symptom of a disease like lupus or fibromyalgia. Myalgia is the medical term for muscle aches. The doctor will do a physical exam and ask questions to try to find what is causing your pain. You may also have tests such as blood tests or imaging tests like X-rays. These can help find or rule out serious problems. The doctor has checked you carefully, but problems can develop later. If you notice any problems or new symptoms, get medical treatment right away. Follow-up care is a key part of your treatment and safety. Be sure to make and go to all appointments, and call your doctor if you are having problems. It's also a good idea to know your test results and keep a list of the medicines you take. How can you care for yourself at home? · Rest the area that hurts. You may need to stop or reduce the activity that causes your symptoms. Then you can return to it slowly. · Put ice or a cold pack on the area for 10 to 20 minutes at a time to ease pain. Put a thin cloth between the ice and your skin. · Take an over-the-counter pain medicine, such as acetaminophen (Tylenol), ibuprofen (Advil, Motrin), or naproxen (Aleve). Be safe with medicines. Read and follow all instructions on the label. When should you call for help? Call your doctor now or seek immediate medical care if:    · Your pain gets worse.     · You have new symptoms, such as a fever, swelling, or a rash.    Watch closely for changes in your health, and be sure to contact your doctor if:    · You do not get better as expected. Where can you learn more? Go to http://negar-jovana.info/.   Enter G355 in the search box to learn more about \"Muscle Aches: Care Instructions. \"  Current as of: October 9, 2017  Content Version: 11.7  © 5846-6362 [employer]. Care instructions adapted under license by [employer] (which disclaims liability or warranty for this information). If you have questions about a medical condition or this instruction, always ask your healthcare professional. [address] any warranty or liability for your use of this information. Dehydration: Care Instructions  Your Care Instructions  Dehydration happens when your body loses too much fluid. This might happen when you do not drink enough water or you lose large amounts of fluids from your body because of diarrhea, vomiting, or sweating. Severe dehydration can be life-threatening. Water and minerals called electrolytes help put your body fluids back in balance. Learn the early signs of fluid loss, and drink more fluids to prevent dehydration. Follow-up care is a key part of your treatment and safety. Be sure to make and go to all appointments, and call your doctor if you are having problems. It's also a good idea to know your test results and keep a list of the medicines you take. How can you care for yourself at home? · To prevent dehydration, drink plenty of fluids, enough so that your urine is light yellow or clear like water. Choose water and other caffeine-free clear liquids until you feel better. If you have kidney, heart, or liver disease and have to limit fluids, talk with your doctor before you increase the amount of fluids you drink. · If you do not feel like eating or drinking, try taking small sips of water, sports drinks, or other rehydration drinks. · Get plenty of rest.  To prevent dehydration  · Add more fluids to your diet and daily routine, unless your doctor has told you not to. · During hot weather, drink more fluids. Drink even more fluids if you exercise a lot.  Stay away from drinks with alcohol or caffeine. · Watch for the symptoms of dehydration. These include:  ¨ A dry, sticky mouth. ¨ Dark yellow urine, and not much of it. ¨ Dry and sunken eyes. ¨ Feeling very tired. · Learn what problems can lead to dehydration. These include:  ¨ Diarrhea, fever, and vomiting. ¨ Any illness with a fever, such as pneumonia or the flu. ¨ Activities that cause heavy sweating, such as endurance races and heavy outdoor work in hot or humid weather. ¨ Alcohol or drug abuse or withdrawal.  ¨ Certain medicines, such as cold and allergy pills (antihistamines), diet pills (diuretics), and laxatives. ¨ Certain diseases, such as diabetes, cancer, and heart or kidney disease. When should you call for help? Call 911 anytime you think you may need emergency care. For example, call if:    · You passed out (lost consciousness).    Call your doctor now or seek immediate medical care if:    · You are confused and cannot think clearly.     · You are dizzy or lightheaded, or you feel like you may faint.     · You have signs of needing more fluids. You have sunken eyes and a dry mouth, and you pass only a little dark urine.     · You cannot keep fluids down.    Watch closely for changes in your health, and be sure to contact your doctor if:    · You are not making tears.     · Your skin is very dry and sags slowly back into place after you pinch it.     · Your mouth and eyes are very dry. Where can you learn more? Go to http://negar-jovana.info/. Enter C511 in the search box to learn more about \"Dehydration: Care Instructions. \"  Current as of: November 20, 2017  Content Version: 11.7  © 0351-7746 "[employer]. Care instructions adapted under license by [employer] (which disclaims liability or warranty for this information).  If you have questions about a medical condition or this instruction, always ask your healthcare professional. [employer] disclaims any warranty or liability for your use of this information.

## 2018-07-31 NOTE — ED NOTES
Discharge instructions given to pt by RN. Pt educated discharge instructions in teach back method and verbalizes understanding. Opportunity for questions provided. Pt ambulatory out of unit, in no acute distress and taken home by family

## 2018-08-01 LAB
BACTERIA SPEC CULT: NORMAL
SERVICE CMNT-IMP: NORMAL

## 2021-12-14 ENCOUNTER — TRANSCRIBE ORDER (OUTPATIENT)
Dept: SCHEDULING | Age: 36
End: 2021-12-14

## 2021-12-14 DIAGNOSIS — M25.579 ANKLE JOINT PAIN: Primary | ICD-10-CM

## 2021-12-21 ENCOUNTER — HOSPITAL ENCOUNTER (OUTPATIENT)
Dept: MRI IMAGING | Age: 36
Discharge: HOME | End: 2021-12-21
Attending: PHYSICIAN ASSISTANT
Payer: OTHER GOVERNMENT

## 2021-12-21 ENCOUNTER — APPOINTMENT (OUTPATIENT)
Dept: MRI IMAGING | Age: 36
End: 2021-12-21
Attending: PHYSICIAN ASSISTANT
Payer: OTHER GOVERNMENT

## 2021-12-21 DIAGNOSIS — M25.579 ANKLE JOINT PAIN: ICD-10-CM

## 2021-12-21 PROCEDURE — 73721 MRI JNT OF LWR EXTRE W/O DYE: CPT

## 2022-04-01 ENCOUNTER — TRANSCRIBE ORDER (OUTPATIENT)
Dept: SCHEDULING | Age: 37
End: 2022-04-01

## 2022-04-01 DIAGNOSIS — M25.512 BILATERAL SHOULDER PAIN: Primary | ICD-10-CM

## 2022-04-01 DIAGNOSIS — M25.511 BILATERAL SHOULDER PAIN: Primary | ICD-10-CM

## 2022-04-08 ENCOUNTER — HOSPITAL ENCOUNTER (OUTPATIENT)
Dept: MRI IMAGING | Age: 37
Discharge: HOME | End: 2022-04-08
Attending: FAMILY MEDICINE
Payer: OTHER GOVERNMENT

## 2022-04-08 DIAGNOSIS — M25.511 BILATERAL SHOULDER PAIN: ICD-10-CM

## 2022-04-08 DIAGNOSIS — M25.512 BILATERAL SHOULDER PAIN: ICD-10-CM

## 2022-04-08 PROCEDURE — 73221 MRI JOINT UPR EXTREM W/O DYE: CPT

## 2022-06-09 NOTE — ED NOTES
Assessment and Plan:     Problem List Items Addressed This Visit    None       BMI Counseling: Body mass index is 27 11 kg/m²  The BMI is above normal  Nutrition recommendations include decreasing portion sizes and encouraging healthy choices of fruits and vegetables  Exercise recommendations include exercising 3-5 times per week  Rationale for BMI follow-up plan is due to patient being overweight or obese  Depression Screening and Follow-up Plan: Patient was screened for depression during today's encounter  They screened negative with a PHQ-2 score of 0  Preventive health issues were discussed with patient, and age appropriate screening tests were ordered as noted in patient's After Visit Summary  Personalized health advice and appropriate referrals for health education or preventive services given if needed, as noted in patient's After Visit Summary  History of Present Illness:     Patient presents for Medicare Annual Wellness visit    Patient Care Team:  Shmuel Cabrera DO as PCP - MD Shmuel Mathis, Muriel Collazo MD     Problem List:     Patient Active Problem List   Diagnosis    Mixed hyperlipidemia    Essential hypertension    Lumbar spondylosis    Rheumatoid arthritis of multiple sites with negative rheumatoid factor ([address] )    Ventral hernia    Vitamin D deficiency    Elevated fasting blood sugar    Hypokalemia    Dog bite of left hand      Past Medical and Surgical History:     Past Medical History:   Diagnosis Date    Arthritis 9/14/014    Atrial bigeminy     resolved 10/10/14     Hypertension ?      Past Surgical History:   Procedure Laterality Date    DENTAL SURGERY      wisdom teeth extraction    NEUROPLASTY / TRANSPOSITION MEDIAN NERVE AT CARPAL TUNNEL        Family History:     Family History   Problem Relation Age of Onset    Heart disease Mother         cardiac disorder    No Known Problems Father     No Known Problems Maternal Grandmother     No Echo at bedside Known Problems Maternal Grandfather     No Known Problems Paternal Grandmother     No Known Problems Paternal Grandfather     Alcohol abuse Sister     Substance Abuse Sister     Alcohol abuse Brother     No Known Problems Paternal Aunt     Mental illness Neg Hx       Social History:     Social History     Socioeconomic History    Marital status: Single     Spouse name: None    Number of children: None    Years of education: high school or GED / 12 GRADE    Highest education level: None   Occupational History    Occupation: [address]  [employer]    Tobacco Use    Smoking status: Former Smoker     Packs/day: 1 00     Years: 30 00     Pack years: 30      Types: Cigarettes     Start date: 1974     Quit date:      Years since quittin 4    Smokeless tobacco: Never Used    Tobacco comment: quit smoking in    Substance and Sexual Activity    Alcohol use:  Yes     Alcohol/week: 3 0 - 6 0 standard drinks     Types: 3 - 6 Cans of beer per week     Comment: More or less    Drug use: No    Sexual activity: Not Currently     Partners: Female   Other Topics Concern    None   Social History Narrative    Always uses seat belt    Daily caffeine consumption / drinks 1 cup of soda a day     Social Determinants of Health     Financial Resource Strain: Not on file   Food Insecurity: Not on file   Transportation Needs: Not on file   Physical Activity: Not on file   Stress: Not on file   Social Connections: Not on file   Intimate Partner Violence: Not on file   Housing Stability: Not on file      Medications and Allergies:     Current Outpatient Medications   Medication Sig Dispense Refill    aspirin 81 MG tablet Take 1 tablet by mouth daily      atorvastatin (LIPITOR) 40 mg tablet Take 1 tablet (40 mg total) by mouth daily at bedtime 90 tablet 3    calcium carbonate (TUMS ULTRA) 1000 MG chewable tablet Chew 2 tablets daily      Cholecalciferol (Vitamin D3) 50 MCG (2000 UT) capsule 2 pills daily to equal 4000 units  0    doxazosin (CARDURA) 2 mg tablet Take 1 tablet (2 mg total) by mouth daily at bedtime 90 tablet 0    DULoxetine (CYMBALTA) 60 mg delayed release capsule Take 1 capsule (60 mg total) by mouth daily 90 capsule 3    folic acid (FOLVITE) 1 mg tablet Take 1 tablet (1,000 mcg total) by mouth daily 90 tablet 3    levothyroxine (Euthyrox) 25 mcg tablet Take 1 tablet (25 mcg total) by mouth daily 90 tablet 0    methotrexate 2 5 mg tablet Take 4 tablets by mouth once a week 36 tablet 3    multivitamin (THERAGRAN) TABS Take 1 tablet by mouth daily      potassium chloride (K-DUR,KLOR-CON) 20 mEq tablet Take 1 tablet (20 mEq total) by mouth daily 90 tablet 3    Tofacitinib Citrate ER 11 MG TB24 Take by mouth      verapamil (VERELAN) 240 MG 24 hr capsule Take 1 capsule (240 mg total) by mouth daily 90 capsule 1    valACYclovir (VALTREX) 1,000 mg tablet Take 1 tablet (1,000 mg total) by mouth 3 (three) times a day for 7 days 21 tablet 0     No current facility-administered medications for this visit  Allergies   Allergen Reactions    Codeine Vomiting     Category: Allergy;     Lisinopril Rash     Category:  Allergy;       Immunizations:     Immunization History   Administered Date(s) Administered    COVID-19 MODERNA VACC 0 5 ML IM 03/18/2021, 04/15/2021, 11/03/2021    INFLUENZA 11/18/2020    Influenza, injectable, quadrivalent, preservative free 0 5 mL 10/17/2018, 11/18/2020    Influenza, recombinant, quadrivalent,injectable, preservative free 11/15/2019, 11/11/2021    Pneumococcal Conjugate 13-Valent 05/31/2016, 02/20/2017    Pneumococcal Polysaccharide PPV23 10/17/2018    Tdap 05/31/2016, 11/12/2021    Unknown 03/18/2021, 04/15/2021, 11/03/2021      Health Maintenance:         Topic Date Due    Colorectal Cancer Screening  09/01/2021    HIV Screening  11/12/2023 (Originally 1/24/1971)    Hepatitis C Screening  12/11/2023 (Originally 1956)    Breast Cancer Screening: Mammogram 11/02/2022    Cervical Cancer Screening  11/11/2023    DXA SCAN  10/06/2024         Topic Date Due    COVID-19 Vaccine (4 - Booster for Moderna series) 02/03/2022      Medicare Health Risk Assessment:     There were no vitals taken for this visit  Nessa Urban is here for her Subsequent Wellness visit  Health Risk Assessment:   Patient rates overall health as good  Patient feels that their physical health rating is same  Patient is satisfied with their life  Eyesight was rated as same  Hearing was rated as same  Patient feels that their emotional and mental health rating is same  Patients states they are never, rarely angry  Patient states they are sometimes unusually tired/fatigued  Pain experienced in the last 7 days has been none  Patient states that she has experienced no weight loss or gain in last 6 months  Depression Screening:   PHQ-2 Score: 0      Fall Risk Screening: In the past year, patient has experienced: no history of falling in past year      Urinary Incontinence Screening:   Patient has not leaked urine accidently in the last six months  Home Safety:  Patient does not have trouble with stairs inside or outside of their home  Patient has working smoke alarms and has working carbon monoxide detector  Home safety hazards include: none  Nutrition:   Current diet is Regular and Limited junk food  Medications:   Patient is currently taking over-the-counter supplements  OTC medications include: see medication list  Patient is able to manage medications  Activities of Daily Living (ADLs)/Instrumental Activities of Daily Living (IADLs):   Walk and transfer into and out of bed and chair?: Yes  Dress and groom yourself?: Yes    Bathe or shower yourself?: Yes    Feed yourself?  Yes  Do your laundry/housekeeping?: Yes  Manage your money, pay your bills and track your expenses?: Yes  Make your own meals?: Yes    Do your own shopping?: Yes    Previous Hospitalizations:   Any hospitalizations or ED visits within the last 12 months?: Yes    How many hospitalizations have you had in the last year?: 1-2    Advance Care Planning:   Living will: Yes    Advanced directive: Yes      PREVENTIVE SCREENINGS      Cardiovascular Screening:    General: Screening Not Indicated and History Lipid Disorder      Colorectal Cancer Screening:     General: Screening Current      Breast Cancer Screening:     General: Screening Current      Cervical Cancer Screening:    General: Screening Current      Osteoporosis Screening:    General: Screening Current      Lung Cancer Screening:     General: Screening Not Indicated      Hepatitis C Screening:    General: Screening Current    Screening, Brief Intervention, and Referral to Treatment (SBIRT)    Screening  Typical number of drinks in a day: 3  Typical number of drinks in a week: 21  Interpretation: Low risk drinking behavior      Single Item Drug Screening:  How often have you used an illegal drug (including marijuana) or a prescription medication for non-medical reasons in the past year? never    Single Item Drug Screen Score: 0  Interpretation: Negative screen for possible drug use disorder      Pavithra Arriola DO  Answers for HPI/ROS submitted by the patient on 6/2/2022  Chronicity: recurrent  Onset: more than 1 year ago  Progression since onset: gradually improving  Condition status: controlled  anxiety: No  blurred vision: No  chest pain: No  headaches: No  malaise/fatigue: No  neck pain: No  orthopnea: No  palpitations: No  peripheral edema: No  PND: No  shortness of breath: No  sweats: No  Agents associated with hypertension: no associated agents  Compliance problems: no compliance problems      Answers for HPI/ROS submitted by the patient on 6/2/2022  Chronicity: recurrent  Onset: more than 1 year ago  Progression since onset: gradually improving  Condition status: controlled  anxiety: No  blurred vision: No  chest pain: No  headaches: No  malaise/fatigue: No  neck pain: No  orthopnea: No  palpitations: No  peripheral edema: No  PND: No  shortness of breath: No  sweats: No  Agents associated with hypertension: no associated agents  Compliance problems: no compliance problems

## 2022-07-09 ENCOUNTER — HOSPITAL ENCOUNTER (EMERGENCY)
Age: 37
Discharge: HOME | End: 2022-07-09
Attending: EMERGENCY MEDICINE
Payer: OTHER GOVERNMENT

## 2022-07-09 VITALS
TEMPERATURE: 98 F | RESPIRATION RATE: 16 BRPM | DIASTOLIC BLOOD PRESSURE: 96 MMHG | OXYGEN SATURATION: 98 % | SYSTOLIC BLOOD PRESSURE: 153 MMHG | HEART RATE: 82 BPM

## 2022-07-09 DIAGNOSIS — G89.18 ACUTE POST-OPERATIVE PAIN: Primary | ICD-10-CM

## 2022-07-09 PROCEDURE — 74011250636 HC RX REV CODE- 250/636: Performed by: PHYSICIAN ASSISTANT

## 2022-07-09 PROCEDURE — 99284 EMERGENCY DEPT VISIT MOD MDM: CPT

## 2022-07-09 PROCEDURE — 96372 THER/PROPH/DIAG INJ SC/IM: CPT

## 2022-07-09 RX ORDER — HYDROMORPHONE HYDROCHLORIDE 1 MG/ML
1 INJECTION, SOLUTION INTRAMUSCULAR; INTRAVENOUS; SUBCUTANEOUS ONCE
Status: DISCONTINUED | OUTPATIENT
Start: 2022-07-09 | End: 2022-07-09

## 2022-07-09 RX ORDER — HYDROMORPHONE HYDROCHLORIDE 1 MG/ML
1 INJECTION, SOLUTION INTRAMUSCULAR; INTRAVENOUS; SUBCUTANEOUS ONCE
Status: COMPLETED | OUTPATIENT
Start: 2022-07-09 | End: 2022-07-09

## 2022-07-09 RX ADMIN — HYDROMORPHONE HYDROCHLORIDE 1 MG: 1 INJECTION, SOLUTION INTRAMUSCULAR; INTRAVENOUS; SUBCUTANEOUS at 16:11

## 2022-07-09 NOTE — ED TRIAGE NOTES
Pt presents to department with a CC of uncontrolled post-op pain. Pt had surgery at the [place] on his right foot with hardware placed yesterday and was d/c on 5mg oxycodone. Pt reports last dose 1.5 hours ago with minimal pain relief.

## 2022-07-11 NOTE — ED PROVIDER NOTES
Date of Service:  7/9/2022    Patient:  Russell Aiken. Chief Complaint:  Post-Op Problem       HPI:  Russell Parsons is a 30 y.o.  male who presents for evaluation of postoperative right foot pain. Patient states that he had surgery on his right foot yesterday. Patient states that he was given 10 tablets of oxycodone. Patient states that he is taking all 10 tablets of oxycodone since yesterday. Presents to the ER today for pain medication. States that he did not call his surgeon. Patient presents in a wheelchair with a cast on the right foot. Patient denies chest pain, shortness of breath, or abdominal pain. Patient denies fever, chills, or body aches. Past Medical History:   Diagnosis Date    Pulmonary embolism and infarction ([facility] [address]        No past surgical history on file. Family History:   Problem Relation Age of Onset    Hypertension Mother        Social History     Socioeconomic History    Marital status: SINGLE     Spouse name: Not on file    Number of children: Not on file    Years of education: Not on file    Highest education level: Not on file   Occupational History    Not on file   Tobacco Use    Smoking status: Never Smoker    Smokeless tobacco: Never Used   Substance and Sexual Activity    Alcohol use: No    Drug use: No    Sexual activity: Yes     Partners: Female     Birth control/protection: None   Other Topics Concern    Not on file   Social History Narrative    ** Merged History Encounter **          Social Determinants of Health     Financial Resource Strain:     Difficulty of Paying Living Expenses: Not on file   Food Insecurity:     Worried About Running Out of Food in the Last Year: Not on file    Nick of Food in the Last Year: Not on file   Transportation Needs:     Lack of Transportation (Medical): Not on file    Lack of Transportation (Non-Medical):  Not on file   Physical Activity:     Days of Exercise per Week: Not on file    Minutes of Exercise per Session: Not on file   Stress:     Feeling of Stress : Not on file   Social Connections:     Frequency of Communication with Friends and Family: Not on file    Frequency of Social Gatherings with Friends and Family: Not on file    Attends Christianity Services: Not on file    Active Member of Clubs or Organizations: Not on file    Attends Club or Organization Meetings: Not on file    Marital Status: Not on file   Intimate Partner Violence:     Fear of Current or Ex-Partner: Not on file    Emotionally Abused: Not on file    Physically Abused: Not on file    Sexually Abused: Not on file   Housing Stability:     Unable to Pay for Housing in the Last Year: Not on file    Number of [place] in the Last Year: Not on file    Unstable Housing in the Last Year: Not on file         ALLERGIES: Patient has no known allergies. Review of Systems   Constitutional: Negative for chills and fever. Respiratory: Negative for shortness of breath. Cardiovascular: Negative for chest pain. Gastrointestinal: Negative for abdominal pain. Genitourinary: Negative for dysuria. Musculoskeletal: Negative for back pain. Skin: Negative for rash. Allergic/Immunologic: Negative for immunocompromised state. Neurological: Negative for headaches. Psychiatric/Behavioral: Negative for agitation. All other systems reviewed and are negative. Vitals:    07/09/22 1514   BP: (!) 153/96   Pulse: 82   Resp: 16   Temp: 98 °F (36.7 °C)   SpO2: 98%            Physical Exam  Vitals and nursing note reviewed. Constitutional:       General: He is not in acute distress. HENT:      Head: Atraumatic. Cardiovascular:      Rate and Rhythm: Normal rate and regular rhythm. Heart sounds: No murmur heard. Pulmonary:      Effort: Pulmonary effort is normal.      Breath sounds: Normal breath sounds. Abdominal:      Palpations: Abdomen is soft. Tenderness: There is no abdominal tenderness. Musculoskeletal:         General: Normal range of motion. Comments: Postop right foot pain. Right foot is in a cast.   Skin:     General: Skin is warm. Neurological:      Mental Status: He is alert and oriented to person, place, and time. Mental status is at baseline. MDM       Procedures          VITAL SIGNS:  No data found. LABS:  No results found for this or any previous visit (from the past 6 hour(s)). IMAGING:  No orders to display         Medications During Visit:  Medications   HYDROmorphone (DILAUDID) injection 1 mg (1 mg IntraMUSCular Given 7/9/22 1611)         DECISION MAKING:  Jose Beltran Sr. is a 30 y.o. male who comes in as above. Patient given Dilaudid while in the emergency department. Patient states improvement of pain. Patient requested another prescription for pain medication. Did Not give patient a prescription for pain medication due to patient taking more than the prescribed dose. Instructed The patient to call his surgeon or return to the [address] [place] [address] emergency department. Patient stable upon discharge. Return precautions given to patient. IMPRESSION:  1.  Acute post-operative pain        DISPOSITION:  Discharged      Discharge Medication List as of 7/9/2022  5:19 PM           Follow-up Information     Follow up With Specialties Details Why Contact Info    [facility] EMERGENCY DEP Emergency Medicine  If symptoms worsen [address] [facility]  [phone]    Carol Mills MD Family Medicine Schedule an appointment as soon as possible for a visit   [address] [facility]  [phone]      Orthopedic surgeon  Schedule an appointment as soon as possible for a visit